# Patient Record
Sex: MALE
[De-identification: names, ages, dates, MRNs, and addresses within clinical notes are randomized per-mention and may not be internally consistent; named-entity substitution may affect disease eponyms.]

---

## 2017-01-09 PROBLEM — Z00.00 ENCOUNTER FOR PREVENTIVE HEALTH EXAMINATION: Status: ACTIVE | Noted: 2017-01-09

## 2017-01-18 ENCOUNTER — APPOINTMENT (OUTPATIENT)
Dept: ORTHOPEDIC SURGERY | Facility: CLINIC | Age: 52
End: 2017-01-18

## 2017-01-18 VITALS — SYSTOLIC BLOOD PRESSURE: 122 MMHG | DIASTOLIC BLOOD PRESSURE: 87 MMHG | HEART RATE: 98 BPM

## 2017-01-18 VITALS — HEIGHT: 66 IN | WEIGHT: 169 LBS | BODY MASS INDEX: 27.16 KG/M2

## 2017-01-18 DIAGNOSIS — Z78.9 OTHER SPECIFIED HEALTH STATUS: ICD-10-CM

## 2017-03-08 ENCOUNTER — APPOINTMENT (OUTPATIENT)
Dept: ORTHOPEDIC SURGERY | Facility: CLINIC | Age: 52
End: 2017-03-08

## 2017-03-08 DIAGNOSIS — M75.41 IMPINGEMENT SYNDROME OF RIGHT SHOULDER: ICD-10-CM

## 2017-03-17 ENCOUNTER — APPOINTMENT (OUTPATIENT)
Dept: MRI IMAGING | Facility: CLINIC | Age: 52
End: 2017-03-17

## 2017-03-17 ENCOUNTER — OUTPATIENT (OUTPATIENT)
Dept: OUTPATIENT SERVICES | Facility: HOSPITAL | Age: 52
LOS: 1 days | End: 2017-03-17
Payer: MEDICAID

## 2017-03-17 DIAGNOSIS — M75.41 IMPINGEMENT SYNDROME OF RIGHT SHOULDER: ICD-10-CM

## 2017-03-17 DIAGNOSIS — M75.01 ADHESIVE CAPSULITIS OF RIGHT SHOULDER: ICD-10-CM

## 2017-03-17 PROCEDURE — 73221 MRI JOINT UPR EXTREM W/O DYE: CPT

## 2017-03-20 PROBLEM — M75.01: Status: ACTIVE | Noted: 2017-03-20

## 2018-09-13 ENCOUNTER — APPOINTMENT (OUTPATIENT)
Dept: UROLOGY | Facility: CLINIC | Age: 53
End: 2018-09-13
Payer: MEDICAID

## 2018-09-13 VITALS — DIASTOLIC BLOOD PRESSURE: 80 MMHG | OXYGEN SATURATION: 98 % | HEART RATE: 70 BPM | SYSTOLIC BLOOD PRESSURE: 130 MMHG

## 2018-09-13 PROCEDURE — 99203 OFFICE O/P NEW LOW 30 MIN: CPT

## 2018-09-17 LAB
APPEARANCE: ABNORMAL
BACTERIA UR CULT: ABNORMAL
BACTERIA: ABNORMAL
BILIRUBIN URINE: NEGATIVE
BLOOD URINE: ABNORMAL
COLOR: YELLOW
GLUCOSE QUALITATIVE U: NEGATIVE MG/DL
KETONES URINE: NEGATIVE
LEUKOCYTE ESTERASE URINE: ABNORMAL
MICROSCOPIC-UA: NORMAL
NITRITE URINE: NEGATIVE
PH URINE: 5.5
PROTEIN URINE: 100 MG/DL
RED BLOOD CELLS URINE: 3 /HPF
SPECIFIC GRAVITY URINE: 1.02
SQUAMOUS EPITHELIAL CELLS: 0 /HPF
UROBILINOGEN URINE: NEGATIVE MG/DL
WHITE BLOOD CELLS URINE: 427 /HPF

## 2018-10-04 ENCOUNTER — APPOINTMENT (OUTPATIENT)
Dept: UROLOGY | Facility: CLINIC | Age: 53
End: 2018-10-04
Payer: MEDICAID

## 2018-10-04 VITALS
OXYGEN SATURATION: 97 % | DIASTOLIC BLOOD PRESSURE: 87 MMHG | SYSTOLIC BLOOD PRESSURE: 140 MMHG | HEART RATE: 85 BPM | TEMPERATURE: 98 F | RESPIRATION RATE: 16 BRPM

## 2018-10-04 DIAGNOSIS — N39.0 URINARY TRACT INFECTION, SITE NOT SPECIFIED: ICD-10-CM

## 2018-10-04 PROCEDURE — 99214 OFFICE O/P EST MOD 30 MIN: CPT

## 2018-10-10 LAB
APPEARANCE: CLEAR
BACTERIA UR CULT: NORMAL
BACTERIA: NEGATIVE
BILIRUBIN URINE: NEGATIVE
BLOOD URINE: NEGATIVE
COLOR: YELLOW
GLUCOSE QUALITATIVE U: NEGATIVE MG/DL
HYALINE CASTS: 1 /LPF
KETONES URINE: NEGATIVE
LEUKOCYTE ESTERASE URINE: NEGATIVE
MICROSCOPIC-UA: NORMAL
NITRITE URINE: NEGATIVE
PH URINE: 5.5
PROTEIN URINE: 100 MG/DL
PSA FREE FLD-MCNC: 5.5
PSA FREE SERPL-MCNC: 0.36 NG/ML
PSA SERPL-MCNC: 6.52 NG/ML
RED BLOOD CELLS URINE: 4 /HPF
SPECIFIC GRAVITY URINE: 1.02
SQUAMOUS EPITHELIAL CELLS: 1 /HPF
UROBILINOGEN URINE: NEGATIVE MG/DL
WHITE BLOOD CELLS URINE: 6 /HPF

## 2018-10-10 RX ORDER — CIPROFLOXACIN HYDROCHLORIDE 500 MG/1
500 TABLET, FILM COATED ORAL TWICE DAILY
Qty: 14 | Refills: 0 | Status: DISCONTINUED | COMMUNITY
Start: 2018-09-13 | End: 2018-10-10

## 2018-10-19 ENCOUNTER — APPOINTMENT (OUTPATIENT)
Dept: UROLOGY | Facility: CLINIC | Age: 53
End: 2018-10-19
Payer: MEDICAID

## 2018-10-19 ENCOUNTER — OUTPATIENT (OUTPATIENT)
Dept: OUTPATIENT SERVICES | Facility: HOSPITAL | Age: 53
LOS: 1 days | End: 2018-10-19
Payer: MEDICAID

## 2018-10-19 VITALS
SYSTOLIC BLOOD PRESSURE: 161 MMHG | RESPIRATION RATE: 16 BRPM | HEART RATE: 63 BPM | DIASTOLIC BLOOD PRESSURE: 65 MMHG | TEMPERATURE: 98.1 F

## 2018-10-19 DIAGNOSIS — R35.0 FREQUENCY OF MICTURITION: ICD-10-CM

## 2018-10-19 PROCEDURE — 76872 US TRANSRECTAL: CPT | Mod: 26

## 2018-10-19 PROCEDURE — 55700: CPT

## 2018-10-19 PROCEDURE — 76942 ECHO GUIDE FOR BIOPSY: CPT | Mod: 59

## 2018-10-19 PROCEDURE — 76942 ECHO GUIDE FOR BIOPSY: CPT | Mod: 26,59

## 2018-10-19 PROCEDURE — 76872 US TRANSRECTAL: CPT

## 2018-10-23 DIAGNOSIS — R97.20 ELEVATED PROSTATE SPECIFIC ANTIGEN [PSA]: ICD-10-CM

## 2018-10-23 DIAGNOSIS — N40.2 NODULAR PROSTATE WITHOUT LOWER URINARY TRACT SYMPTOMS: ICD-10-CM

## 2018-10-29 ENCOUNTER — APPOINTMENT (OUTPATIENT)
Dept: UROLOGY | Facility: CLINIC | Age: 53
End: 2018-10-29
Payer: MEDICAID

## 2018-10-29 DIAGNOSIS — N40.2 NODULAR PROSTATE W/OUT LOWER URINARY TRACT SYMPTOMS: ICD-10-CM

## 2018-10-29 LAB — CORE LAB BIOPSY: NORMAL

## 2018-10-29 PROCEDURE — 99214 OFFICE O/P EST MOD 30 MIN: CPT

## 2018-11-06 ENCOUNTER — OTHER (OUTPATIENT)
Age: 53
End: 2018-11-06

## 2018-11-06 PROBLEM — N40.2 PROSTATE NODULE: Status: ACTIVE | Noted: 2018-10-10

## 2018-11-07 ENCOUNTER — FORM ENCOUNTER (OUTPATIENT)
Age: 53
End: 2018-11-07

## 2018-11-08 ENCOUNTER — OUTPATIENT (OUTPATIENT)
Dept: OUTPATIENT SERVICES | Facility: HOSPITAL | Age: 53
LOS: 1 days | End: 2018-11-08
Payer: MEDICAID

## 2018-11-08 ENCOUNTER — APPOINTMENT (OUTPATIENT)
Dept: MRI IMAGING | Facility: IMAGING CENTER | Age: 53
End: 2018-11-08
Payer: MEDICAID

## 2018-11-08 DIAGNOSIS — N40.2 NODULAR PROSTATE WITHOUT LOWER URINARY TRACT SYMPTOMS: ICD-10-CM

## 2018-11-08 DIAGNOSIS — C61 MALIGNANT NEOPLASM OF PROSTATE: ICD-10-CM

## 2018-11-08 PROCEDURE — 72197 MRI PELVIS W/O & W/DYE: CPT | Mod: 26

## 2018-11-08 PROCEDURE — A9585: CPT

## 2018-11-08 PROCEDURE — 72197 MRI PELVIS W/O & W/DYE: CPT

## 2018-11-16 ENCOUNTER — APPOINTMENT (OUTPATIENT)
Dept: UROLOGY | Facility: CLINIC | Age: 53
End: 2018-11-16
Payer: MEDICAID

## 2018-11-16 PROCEDURE — 99214 OFFICE O/P EST MOD 30 MIN: CPT

## 2019-01-10 ENCOUNTER — OUTPATIENT (OUTPATIENT)
Dept: OUTPATIENT SERVICES | Facility: HOSPITAL | Age: 54
LOS: 1 days | End: 2019-01-10

## 2019-01-10 VITALS
OXYGEN SATURATION: 99 % | RESPIRATION RATE: 16 BRPM | SYSTOLIC BLOOD PRESSURE: 160 MMHG | WEIGHT: 171.96 LBS | HEIGHT: 64 IN | DIASTOLIC BLOOD PRESSURE: 78 MMHG | TEMPERATURE: 97 F | HEART RATE: 78 BPM

## 2019-01-10 DIAGNOSIS — C61 MALIGNANT NEOPLASM OF PROSTATE: ICD-10-CM

## 2019-01-10 LAB
APPEARANCE UR: CLEAR — SIGNIFICANT CHANGE UP
BACTERIA # UR AUTO: SIGNIFICANT CHANGE UP
BILIRUB UR-MCNC: NEGATIVE — SIGNIFICANT CHANGE UP
BLD GP AB SCN SERPL QL: NEGATIVE — SIGNIFICANT CHANGE UP
BLOOD UR QL VISUAL: NEGATIVE — SIGNIFICANT CHANGE UP
COLOR SPEC: SIGNIFICANT CHANGE UP
GLUCOSE UR-MCNC: NEGATIVE — SIGNIFICANT CHANGE UP
HYALINE CASTS # UR AUTO: NEGATIVE — SIGNIFICANT CHANGE UP
KETONES UR-MCNC: NEGATIVE — SIGNIFICANT CHANGE UP
LEUKOCYTE ESTERASE UR-ACNC: SIGNIFICANT CHANGE UP
NITRITE UR-MCNC: NEGATIVE — SIGNIFICANT CHANGE UP
PH UR: 6.5 — SIGNIFICANT CHANGE UP (ref 5–8)
PROT UR-MCNC: 100 — HIGH
RBC CASTS # UR COMP ASSIST: SIGNIFICANT CHANGE UP (ref 0–?)
RH IG SCN BLD-IMP: POSITIVE — SIGNIFICANT CHANGE UP
SP GR SPEC: 1.02 — SIGNIFICANT CHANGE UP (ref 1–1.04)
SQUAMOUS # UR AUTO: SIGNIFICANT CHANGE UP
UROBILINOGEN FLD QL: NORMAL — SIGNIFICANT CHANGE UP
WBC UR QL: HIGH (ref 0–?)

## 2019-01-10 NOTE — H&P PST ADULT - PROBLEM SELECTOR PLAN 1
pt scheduled for Robotic Assisted Laparoscopic Prostatectomy, Bilateral Pelvic Lymph Node Dissection, Possible Open on 1/16/19  Preop instructions provided. Pt verbalized understanding.   Pepcid for GI prophylaxis provided   Chlorhexidine wash with instructions provided.   elevated BP @ /78, pt reports "im very nervous", s/p med eval, copy in chart pt scheduled for Robotic Assisted Laparoscopic Prostatectomy, Bilateral Pelvic Lymph Node Dissection, Possible Open on 1/16/19  Preop instructions provided. Pt verbalized understanding.   Pepcid for GI prophylaxis provided   Chlorhexidine wash with instructions provided.   s/p med brigitte, copy in chart  pt's BP @ /80 & 158/78, pt referred to PCP for elevated BP, update clearance requested

## 2019-01-10 NOTE — H&P PST ADULT - ASSESSMENT
53 y.o. female with preop diagnosis of malignant neoplasm of prostate 53 y.o. female with preop diagnosis of malignant neoplasm of prostate. Preoperative urine culture positive for ESBL E. Coli. Patient admitted for IV ertapenem prior to operation.

## 2019-01-10 NOTE — H&P PST ADULT - HISTORY OF PRESENT ILLNESS
53 y.o. male with hx of elevated PSA, followed by prostate biopsy, diagnosed with prostate cancer, denies hematuria, nocturia, dysuria, weight loss or fatigue, presents to PST for evaluation for Robotic Assisted Laparoscopic Prostatectomy, Bilateral Pelvic Lymph Node Dissection, Possible Open on 1/16/19 53 y.o. male with no PMH, reports hx of elevated PSA, followed by prostate biopsy, diagnosed with prostate cancer, denies hematuria, nocturia, dysuria, weight loss or fatigue, presents to [facility] for evaluation for Robotic Assisted Laparoscopic Prostatectomy, Bilateral Pelvic Lymph Node Dissection, Possible Open on 1/16/19 53 y.o. male with no PMH, reports hx of elevated PSA, followed by prostate biopsy, diagnosed with prostate cancer, denies hematuria, nocturia, dysuria, weight loss or fatigue, presents to PST for evaluation for Robotic Assisted Laparoscopic Prostatectomy, Bilateral Pelvic Lymph Node Dissection, Possible Open on 1/16/19    Presented to ER on 1/15 with ESBL in urine. No fever or chills.

## 2019-01-10 NOTE — H&P PST ADULT - NEGATIVE ENMT SYMPTOMS
no dry mouth/no throat pain/no nose bleeds/no dysphagia/no nasal congestion/no hearing difficulty/no sinus symptoms

## 2019-01-10 NOTE — H&P PST ADULT - ATTENDING COMMENTS
Pt seen and examined. Agree with above. IV ertapenem given for ESBL in urine. Minimal sx and non-toxic.

## 2019-01-10 NOTE — H&P PST ADULT - NEGATIVE MUSCULOSKELETAL SYMPTOMS
no joint swelling/no muscle weakness/no neck pain/no myalgia/no muscle cramps/no back pain/no stiffness/no arthritis

## 2019-01-12 LAB — SPECIMEN SOURCE: SIGNIFICANT CHANGE UP

## 2019-01-13 LAB
METHOD TYPE: SIGNIFICANT CHANGE UP
ORGANISM # SPEC MICROSCOPIC CNT: SIGNIFICANT CHANGE UP

## 2019-01-14 ENCOUNTER — MESSAGE (OUTPATIENT)
Age: 54
End: 2019-01-14

## 2019-01-14 LAB
-  AMIKACIN: SIGNIFICANT CHANGE UP
-  AMPICILLIN/SULBACTAM: SIGNIFICANT CHANGE UP
-  AMPICILLIN: SIGNIFICANT CHANGE UP
-  AZTREONAM: SIGNIFICANT CHANGE UP
-  CEFAZOLIN: SIGNIFICANT CHANGE UP
-  CEFEPIME: SIGNIFICANT CHANGE UP
-  CEFOXITIN: SIGNIFICANT CHANGE UP
-  CEFTAZIDIME: SIGNIFICANT CHANGE UP
-  CEFTRIAXONE: SIGNIFICANT CHANGE UP
-  CIPROFLOXACIN: SIGNIFICANT CHANGE UP
-  ERTAPENEM: SIGNIFICANT CHANGE UP
-  GENTAMICIN: SIGNIFICANT CHANGE UP
-  IMIPENEM: SIGNIFICANT CHANGE UP
-  LEVOFLOXACIN: SIGNIFICANT CHANGE UP
-  MEROPENEM: SIGNIFICANT CHANGE UP
-  NITROFURANTOIN: SIGNIFICANT CHANGE UP
-  PIPERACILLIN/TAZOBACTAM: SIGNIFICANT CHANGE UP
-  TIGECYCLINE: SIGNIFICANT CHANGE UP
-  TOBRAMYCIN: SIGNIFICANT CHANGE UP
-  TRIMETHOPRIM/SULFAMETHOXAZOLE: SIGNIFICANT CHANGE UP
BACTERIA UR CULT: SIGNIFICANT CHANGE UP

## 2019-01-15 ENCOUNTER — TRANSCRIPTION ENCOUNTER (OUTPATIENT)
Age: 54
End: 2019-01-15

## 2019-01-15 ENCOUNTER — INPATIENT (INPATIENT)
Facility: HOSPITAL | Age: 54
LOS: 2 days | Discharge: HOME | End: 2019-01-18
Attending: UROLOGY | Admitting: UROLOGY
Payer: MEDICAID

## 2019-01-15 VITALS
HEART RATE: 80 BPM | DIASTOLIC BLOOD PRESSURE: 84 MMHG | OXYGEN SATURATION: 100 % | TEMPERATURE: 98 F | RESPIRATION RATE: 16 BRPM | SYSTOLIC BLOOD PRESSURE: 156 MMHG

## 2019-01-15 DIAGNOSIS — A49.9 BACTERIAL INFECTION, UNSPECIFIED: ICD-10-CM

## 2019-01-15 PROBLEM — C61 MALIGNANT NEOPLASM OF PROSTATE: Chronic | Status: ACTIVE | Noted: 2019-01-10

## 2019-01-15 LAB
ALBUMIN SERPL ELPH-MCNC: 4.7 G/DL — SIGNIFICANT CHANGE UP (ref 3.3–5)
ALP SERPL-CCNC: 75 U/L — SIGNIFICANT CHANGE UP (ref 40–120)
ALT FLD-CCNC: 29 U/L — SIGNIFICANT CHANGE UP (ref 4–41)
ANION GAP SERPL CALC-SCNC: 10 MEQ/L — SIGNIFICANT CHANGE UP (ref 7–14)
APPEARANCE UR: CLEAR — SIGNIFICANT CHANGE UP
APTT BLD: 29.3 SEC — SIGNIFICANT CHANGE UP (ref 27.5–36.3)
AST SERPL-CCNC: 17 U/L — SIGNIFICANT CHANGE UP (ref 4–40)
BACTERIA # UR AUTO: SIGNIFICANT CHANGE UP
BASOPHILS # BLD AUTO: 0.04 K/UL — SIGNIFICANT CHANGE UP (ref 0–0.2)
BASOPHILS NFR BLD AUTO: 0.7 % — SIGNIFICANT CHANGE UP (ref 0–2)
BILIRUB SERPL-MCNC: 0.5 MG/DL — SIGNIFICANT CHANGE UP (ref 0.2–1.2)
BILIRUB UR-MCNC: NEGATIVE — SIGNIFICANT CHANGE UP
BLD GP AB SCN SERPL QL: NEGATIVE — SIGNIFICANT CHANGE UP
BLOOD UR QL VISUAL: NEGATIVE — SIGNIFICANT CHANGE UP
BUN SERPL-MCNC: 12 MG/DL — SIGNIFICANT CHANGE UP (ref 7–23)
CALCIUM SERPL-MCNC: 9.2 MG/DL — SIGNIFICANT CHANGE UP (ref 8.4–10.5)
CHLORIDE SERPL-SCNC: 103 MMOL/L — SIGNIFICANT CHANGE UP (ref 98–107)
CO2 SERPL-SCNC: 27 MMOL/L — SIGNIFICANT CHANGE UP (ref 22–31)
COLOR SPEC: SIGNIFICANT CHANGE UP
CREAT SERPL-MCNC: 0.99 MG/DL — SIGNIFICANT CHANGE UP (ref 0.5–1.3)
EOSINOPHIL # BLD AUTO: 0.11 K/UL — SIGNIFICANT CHANGE UP (ref 0–0.5)
EOSINOPHIL NFR BLD AUTO: 2 % — SIGNIFICANT CHANGE UP (ref 0–6)
GLUCOSE SERPL-MCNC: 94 MG/DL — SIGNIFICANT CHANGE UP (ref 70–99)
GLUCOSE UR-MCNC: NEGATIVE — SIGNIFICANT CHANGE UP
HCT VFR BLD CALC: 45.2 % — SIGNIFICANT CHANGE UP (ref 39–50)
HGB BLD-MCNC: 15.3 G/DL — SIGNIFICANT CHANGE UP (ref 13–17)
HYALINE CASTS # UR AUTO: NEGATIVE — SIGNIFICANT CHANGE UP
IMM GRANULOCYTES NFR BLD AUTO: 0.2 % — SIGNIFICANT CHANGE UP (ref 0–1.5)
INR BLD: 1 — SIGNIFICANT CHANGE UP (ref 0.88–1.17)
KETONES UR-MCNC: NEGATIVE — SIGNIFICANT CHANGE UP
LEUKOCYTE ESTERASE UR-ACNC: SIGNIFICANT CHANGE UP
LYMPHOCYTES # BLD AUTO: 1.53 K/UL — SIGNIFICANT CHANGE UP (ref 1–3.3)
LYMPHOCYTES # BLD AUTO: 27.8 % — SIGNIFICANT CHANGE UP (ref 13–44)
MCHC RBC-ENTMCNC: 28.7 PG — SIGNIFICANT CHANGE UP (ref 27–34)
MCHC RBC-ENTMCNC: 33.8 % — SIGNIFICANT CHANGE UP (ref 32–36)
MCV RBC AUTO: 84.8 FL — SIGNIFICANT CHANGE UP (ref 80–100)
MONOCYTES # BLD AUTO: 0.69 K/UL — SIGNIFICANT CHANGE UP (ref 0–0.9)
MONOCYTES NFR BLD AUTO: 12.5 % — SIGNIFICANT CHANGE UP (ref 2–14)
NEUTROPHILS # BLD AUTO: 3.12 K/UL — SIGNIFICANT CHANGE UP (ref 1.8–7.4)
NEUTROPHILS NFR BLD AUTO: 56.8 % — SIGNIFICANT CHANGE UP (ref 43–77)
NITRITE UR-MCNC: NEGATIVE — SIGNIFICANT CHANGE UP
NRBC # FLD: 0 K/UL — LOW (ref 25–125)
PH UR: 6.5 — SIGNIFICANT CHANGE UP (ref 5–8)
PLATELET # BLD AUTO: 193 K/UL — SIGNIFICANT CHANGE UP (ref 150–400)
PMV BLD: 11.1 FL — SIGNIFICANT CHANGE UP (ref 7–13)
POTASSIUM SERPL-MCNC: 4 MMOL/L — SIGNIFICANT CHANGE UP (ref 3.5–5.3)
POTASSIUM SERPL-SCNC: 4 MMOL/L — SIGNIFICANT CHANGE UP (ref 3.5–5.3)
PROT SERPL-MCNC: 7.6 G/DL — SIGNIFICANT CHANGE UP (ref 6–8.3)
PROT UR-MCNC: 50 — SIGNIFICANT CHANGE UP
PROTHROM AB SERPL-ACNC: 11.1 SEC — SIGNIFICANT CHANGE UP (ref 9.8–13.1)
RBC # BLD: 5.33 M/UL — SIGNIFICANT CHANGE UP (ref 4.2–5.8)
RBC # FLD: 12.7 % — SIGNIFICANT CHANGE UP (ref 10.3–14.5)
RBC CASTS # UR COMP ASSIST: SIGNIFICANT CHANGE UP (ref 0–?)
RH IG SCN BLD-IMP: POSITIVE — SIGNIFICANT CHANGE UP
SODIUM SERPL-SCNC: 140 MMOL/L — SIGNIFICANT CHANGE UP (ref 135–145)
SP GR SPEC: 1.01 — SIGNIFICANT CHANGE UP (ref 1–1.04)
SQUAMOUS # UR AUTO: SIGNIFICANT CHANGE UP
UROBILINOGEN FLD QL: NORMAL — SIGNIFICANT CHANGE UP
WBC # BLD: 5.5 K/UL — SIGNIFICANT CHANGE UP (ref 3.8–10.5)
WBC # FLD AUTO: 5.5 K/UL — SIGNIFICANT CHANGE UP (ref 3.8–10.5)
WBC UR QL: HIGH (ref 0–?)

## 2019-01-15 RX ORDER — SODIUM CHLORIDE 9 MG/ML
1000 INJECTION INTRAMUSCULAR; INTRAVENOUS; SUBCUTANEOUS
Qty: 0 | Refills: 0 | Status: DISCONTINUED | OUTPATIENT
Start: 2019-01-15 | End: 2019-01-15

## 2019-01-15 RX ORDER — SODIUM CHLORIDE 9 MG/ML
1000 INJECTION INTRAMUSCULAR; INTRAVENOUS; SUBCUTANEOUS
Qty: 0 | Refills: 0 | Status: DISCONTINUED | OUTPATIENT
Start: 2019-01-15 | End: 2019-01-16

## 2019-01-15 RX ORDER — OXYCODONE AND ACETAMINOPHEN 5; 325 MG/1; MG/1
2 TABLET ORAL EVERY 6 HOURS
Qty: 0 | Refills: 0 | Status: DISCONTINUED | OUTPATIENT
Start: 2019-01-15 | End: 2019-01-16

## 2019-01-15 RX ORDER — SENNA PLUS 8.6 MG/1
2 TABLET ORAL AT BEDTIME
Qty: 0 | Refills: 0 | Status: DISCONTINUED | OUTPATIENT
Start: 2019-01-15 | End: 2019-01-18

## 2019-01-15 RX ORDER — ONDANSETRON 8 MG/1
4 TABLET, FILM COATED ORAL EVERY 6 HOURS
Qty: 0 | Refills: 0 | Status: DISCONTINUED | OUTPATIENT
Start: 2019-01-15 | End: 2019-01-18

## 2019-01-15 RX ORDER — OXYCODONE AND ACETAMINOPHEN 5; 325 MG/1; MG/1
1 TABLET ORAL EVERY 4 HOURS
Qty: 0 | Refills: 0 | Status: DISCONTINUED | OUTPATIENT
Start: 2019-01-15 | End: 2019-01-16

## 2019-01-15 RX ORDER — ERTAPENEM SODIUM 1 G/1
1000 INJECTION, POWDER, LYOPHILIZED, FOR SOLUTION INTRAMUSCULAR; INTRAVENOUS ONCE
Qty: 0 | Refills: 0 | Status: COMPLETED | OUTPATIENT
Start: 2019-01-15 | End: 2019-01-15

## 2019-01-15 RX ORDER — HEPARIN SODIUM 5000 [USP'U]/ML
5000 INJECTION INTRAVENOUS; SUBCUTANEOUS EVERY 8 HOURS
Qty: 0 | Refills: 0 | Status: DISCONTINUED | OUTPATIENT
Start: 2019-01-15 | End: 2019-01-18

## 2019-01-15 RX ADMIN — SODIUM CHLORIDE 150 MILLILITER(S): 9 INJECTION INTRAMUSCULAR; INTRAVENOUS; SUBCUTANEOUS at 11:13

## 2019-01-15 RX ADMIN — ERTAPENEM SODIUM 120 MILLIGRAM(S): 1 INJECTION, POWDER, LYOPHILIZED, FOR SOLUTION INTRAMUSCULAR; INTRAVENOUS at 11:13

## 2019-01-15 RX ADMIN — HEPARIN SODIUM 5000 UNIT(S): 5000 INJECTION INTRAVENOUS; SUBCUTANEOUS at 22:22

## 2019-01-15 RX ADMIN — ERTAPENEM SODIUM 1000 MILLIGRAM(S): 1 INJECTION, POWDER, LYOPHILIZED, FOR SOLUTION INTRAMUSCULAR; INTRAVENOUS at 12:25

## 2019-01-15 RX ADMIN — SODIUM CHLORIDE 75 MILLILITER(S): 9 INJECTION INTRAMUSCULAR; INTRAVENOUS; SUBCUTANEOUS at 12:25

## 2019-01-15 NOTE — ED PROVIDER NOTE - MEDICAL DECISION MAKING DETAILS
pt with known prostate cancer, prostatectomy tomorrow, sent for ESBL UTI on preop labs, no symptoms; will speak with urology and check preop labs, give abx, re-eval

## 2019-01-15 NOTE — ED ADULT NURSE REASSESSMENT NOTE - NS ED NURSE REASSESS COMMENT FT1
received pt from intake to rw, pt A&OX3 Luxembourgish speaking but understands english, daughter at bedside, pt denies pain at this time, lying on stretcher safety maintained will monitor,

## 2019-01-15 NOTE — ED ADULT TRIAGE NOTE - CHIEF COMPLAINT QUOTE
patient is scheduled to have prostate surgery tomorrow, pre op labs performed 01/10/19, and was sent to ED for treatment of (+) Urine infection and IV antibiotic tx.

## 2019-01-15 NOTE — ED PROVIDER NOTE - CARE PLAN
Principal Discharge DX:	ESBL (extended spectrum beta-lactamase) producing bacteria infection  Secondary Diagnosis:	Prostate cancer

## 2019-01-15 NOTE — ED PROVIDER NOTE - PROGRESS NOTE DETAILS
Dr. Calzada: spoke to urology, will evaluate pt in ED, recommend ertapenem Dr. Calzada: pt seen by urology, recommend admission to Dr. Santana

## 2019-01-15 NOTE — ED PROVIDER NOTE - OBJECTIVE STATEMENT
52 yo male with prostate cancer, scheduled for prostatectomy with Dr. Santana tomorrow, sent to ED by Dr. Santana's office because pre-op labs showed ESBL+ UA.  Pt notes no complaints--no dysuria, hematuria, fever, N/V/D, abdominal pain, CP/SOB or groin pain.

## 2019-01-16 ENCOUNTER — RESULT REVIEW (OUTPATIENT)
Age: 54
End: 2019-01-16

## 2019-01-16 ENCOUNTER — APPOINTMENT (OUTPATIENT)
Dept: UROLOGY | Facility: HOSPITAL | Age: 54
End: 2019-01-16

## 2019-01-16 DIAGNOSIS — C61 MALIGNANT NEOPLASM OF PROSTATE: ICD-10-CM

## 2019-01-16 PROCEDURE — 38571 LAPAROSCOPY LYMPHADENECTOMY: CPT

## 2019-01-16 PROCEDURE — 99222 1ST HOSP IP/OBS MODERATE 55: CPT | Mod: 25

## 2019-01-16 PROCEDURE — 88309 TISSUE EXAM BY PATHOLOGIST: CPT | Mod: 26

## 2019-01-16 PROCEDURE — 88307 TISSUE EXAM BY PATHOLOGIST: CPT | Mod: 26

## 2019-01-16 PROCEDURE — 55866 LAPS SURG PRST8ECT RPBIC RAD: CPT

## 2019-01-16 RX ORDER — OXYCODONE HYDROCHLORIDE 5 MG/1
5 TABLET ORAL EVERY 6 HOURS
Qty: 0 | Refills: 0 | Status: DISCONTINUED | OUTPATIENT
Start: 2019-01-16 | End: 2019-01-18

## 2019-01-16 RX ORDER — KETOROLAC TROMETHAMINE 30 MG/ML
15 SYRINGE (ML) INJECTION EVERY 6 HOURS
Qty: 0 | Refills: 0 | Status: DISCONTINUED | OUTPATIENT
Start: 2019-01-16 | End: 2019-01-18

## 2019-01-16 RX ORDER — OXYCODONE HYDROCHLORIDE 5 MG/1
10 TABLET ORAL EVERY 6 HOURS
Qty: 0 | Refills: 0 | Status: DISCONTINUED | OUTPATIENT
Start: 2019-01-16 | End: 2019-01-18

## 2019-01-16 RX ORDER — ACETAMINOPHEN 500 MG
650 TABLET ORAL EVERY 6 HOURS
Qty: 0 | Refills: 0 | Status: DISCONTINUED | OUTPATIENT
Start: 2019-01-16 | End: 2019-01-18

## 2019-01-16 RX ORDER — SODIUM CHLORIDE 9 MG/ML
1000 INJECTION, SOLUTION INTRAVENOUS
Qty: 0 | Refills: 0 | Status: DISCONTINUED | OUTPATIENT
Start: 2019-01-16 | End: 2019-01-17

## 2019-01-16 RX ORDER — ONDANSETRON 8 MG/1
4 TABLET, FILM COATED ORAL ONCE
Qty: 0 | Refills: 0 | Status: DISCONTINUED | OUTPATIENT
Start: 2019-01-16 | End: 2019-01-18

## 2019-01-16 RX ORDER — ERTAPENEM SODIUM 1 G/1
1000 INJECTION, POWDER, LYOPHILIZED, FOR SOLUTION INTRAMUSCULAR; INTRAVENOUS EVERY 24 HOURS
Qty: 0 | Refills: 0 | Status: DISCONTINUED | OUTPATIENT
Start: 2019-01-17 | End: 2019-01-18

## 2019-01-16 RX ORDER — ERTAPENEM SODIUM 1 G/1
1000 INJECTION, POWDER, LYOPHILIZED, FOR SOLUTION INTRAMUSCULAR; INTRAVENOUS ONCE
Qty: 0 | Refills: 0 | Status: COMPLETED | OUTPATIENT
Start: 2019-01-16 | End: 2019-01-16

## 2019-01-16 RX ORDER — ERTAPENEM SODIUM 1 G/1
INJECTION, POWDER, LYOPHILIZED, FOR SOLUTION INTRAMUSCULAR; INTRAVENOUS
Qty: 0 | Refills: 0 | Status: DISCONTINUED | OUTPATIENT
Start: 2019-01-16 | End: 2019-01-18

## 2019-01-16 RX ORDER — HYDROMORPHONE HYDROCHLORIDE 2 MG/ML
0.5 INJECTION INTRAMUSCULAR; INTRAVENOUS; SUBCUTANEOUS
Qty: 0 | Refills: 0 | Status: DISCONTINUED | OUTPATIENT
Start: 2019-01-16 | End: 2019-01-16

## 2019-01-16 RX ORDER — OXYBUTYNIN CHLORIDE 5 MG
5 TABLET ORAL THREE TIMES A DAY
Qty: 0 | Refills: 0 | Status: DISCONTINUED | OUTPATIENT
Start: 2019-01-16 | End: 2019-01-18

## 2019-01-16 RX ORDER — DOCUSATE SODIUM 100 MG
100 CAPSULE ORAL THREE TIMES A DAY
Qty: 0 | Refills: 0 | Status: DISCONTINUED | OUTPATIENT
Start: 2019-01-16 | End: 2019-01-18

## 2019-01-16 RX ADMIN — HYDROMORPHONE HYDROCHLORIDE 0.5 MILLIGRAM(S): 2 INJECTION INTRAMUSCULAR; INTRAVENOUS; SUBCUTANEOUS at 21:15

## 2019-01-16 RX ADMIN — HYDROMORPHONE HYDROCHLORIDE 0.5 MILLIGRAM(S): 2 INJECTION INTRAMUSCULAR; INTRAVENOUS; SUBCUTANEOUS at 20:40

## 2019-01-16 RX ADMIN — Medication 100 MILLIGRAM(S): at 21:46

## 2019-01-16 RX ADMIN — OXYCODONE HYDROCHLORIDE 10 MILLIGRAM(S): 5 TABLET ORAL at 22:31

## 2019-01-16 RX ADMIN — ERTAPENEM SODIUM 120 MILLIGRAM(S): 1 INJECTION, POWDER, LYOPHILIZED, FOR SOLUTION INTRAMUSCULAR; INTRAVENOUS at 21:46

## 2019-01-16 RX ADMIN — HYDROMORPHONE HYDROCHLORIDE 0.5 MILLIGRAM(S): 2 INJECTION INTRAMUSCULAR; INTRAVENOUS; SUBCUTANEOUS at 20:55

## 2019-01-16 RX ADMIN — HYDROMORPHONE HYDROCHLORIDE 0.5 MILLIGRAM(S): 2 INJECTION INTRAMUSCULAR; INTRAVENOUS; SUBCUTANEOUS at 22:15

## 2019-01-16 RX ADMIN — OXYCODONE HYDROCHLORIDE 10 MILLIGRAM(S): 5 TABLET ORAL at 21:55

## 2019-01-16 RX ADMIN — HYDROMORPHONE HYDROCHLORIDE 0.5 MILLIGRAM(S): 2 INJECTION INTRAMUSCULAR; INTRAVENOUS; SUBCUTANEOUS at 21:16

## 2019-01-16 RX ADMIN — HYDROMORPHONE HYDROCHLORIDE 0.5 MILLIGRAM(S): 2 INJECTION INTRAMUSCULAR; INTRAVENOUS; SUBCUTANEOUS at 21:55

## 2019-01-16 RX ADMIN — HEPARIN SODIUM 5000 UNIT(S): 5000 INJECTION INTRAVENOUS; SUBCUTANEOUS at 21:46

## 2019-01-16 RX ADMIN — HEPARIN SODIUM 5000 UNIT(S): 5000 INJECTION INTRAVENOUS; SUBCUTANEOUS at 06:01

## 2019-01-16 RX ADMIN — HYDROMORPHONE HYDROCHLORIDE 0.5 MILLIGRAM(S): 2 INJECTION INTRAMUSCULAR; INTRAVENOUS; SUBCUTANEOUS at 21:30

## 2019-01-16 NOTE — BRIEF OPERATIVE NOTE - PROCEDURE
<<-----Click on this checkbox to enter Procedure Robot-assisted laparoscopic radical prostatectomy  01/16/2019    Active  [id number]

## 2019-01-16 NOTE — PROGRESS NOTE ADULT - SUBJECTIVE AND OBJECTIVE BOX
Note    Post op Check    s/p : RALP    Pt seen / examined without complaints pain controlled    Vital Signs Last 24 Hrs  T(C): 36.4 (16 Jan 2019 22:00), Max: 37.2 (16 Jan 2019 10:12)  T(F): 97.5 (16 Jan 2019 22:00), Max: 98.9 (16 Jan 2019 10:12)  HR: 84 (16 Jan 2019 22:30) (63 - 89)  BP: 156/76 (16 Jan 2019 22:30) (142/67 - 173/75)  BP(mean): 91 (16 Jan 2019 22:30) (85 - 98)  RR: 13 (16 Jan 2019 22:30) (10 - 17)  SpO2: 99% (16 Jan 2019 22:30) (96% - 100%)    I&O's Summary    15 Amandeep 2019 07:01  -  16 Jan 2019 07:00  --------------------------------------------------------  IN: 0 mL / OUT: 720 mL / NET: -720 mL    16 Jan 2019 07:01  -  16 Jan 2019 23:05  --------------------------------------------------------  IN: 925 mL / OUT: 1140 mL / NET: -215 mL    EBL=500  F250  NICOLAS=90    PHYSICAL EXAM:       Constitutional: awake alert NAD    Respiratory: no resp distress    Cardiovascular: RR    Gastrointestinal: soft, NT/ ND trocar sites CDI, appropriately tender    Genitourinary: enamorado in place draining well- yellow    Extremities: (+) venodynes                                        15.3   5.50  )-----------( 193      ( 15 Amandeep 2019 11:01 )             45.2       01-15    140  |  103  |  12  ----------------------------<  94  4.0   |  27  |  0.99    Ca    9.2      15 Amandeep 2019 11:01    TPro  7.6  /  Alb  4.7  /  TBili  0.5  /  DBili  x   /  AST  17  /  ALT  29  /  AlkPhos  75  01-15

## 2019-01-17 DIAGNOSIS — R03.0 ELEVATED BLOOD-PRESSURE READING, WITHOUT DIAGNOSIS OF HYPERTENSION: ICD-10-CM

## 2019-01-17 DIAGNOSIS — N39.0 URINARY TRACT INFECTION, SITE NOT SPECIFIED: ICD-10-CM

## 2019-01-17 DIAGNOSIS — D50.0 IRON DEFICIENCY ANEMIA SECONDARY TO BLOOD LOSS (CHRONIC): ICD-10-CM

## 2019-01-17 LAB
ANION GAP SERPL CALC-SCNC: 13 MMO/L — SIGNIFICANT CHANGE UP (ref 7–14)
BASOPHILS # BLD AUTO: 0 K/UL — SIGNIFICANT CHANGE UP (ref 0–0.2)
BASOPHILS NFR BLD AUTO: 0 % — SIGNIFICANT CHANGE UP (ref 0–2)
BUN SERPL-MCNC: 17 MG/DL — SIGNIFICANT CHANGE UP (ref 7–23)
CALCIUM SERPL-MCNC: 8.3 MG/DL — LOW (ref 8.4–10.5)
CHLORIDE SERPL-SCNC: 102 MMOL/L — SIGNIFICANT CHANGE UP (ref 98–107)
CO2 SERPL-SCNC: 25 MMOL/L — SIGNIFICANT CHANGE UP (ref 22–31)
CREAT FLD-MCNC: 1.07 MG/DL — SIGNIFICANT CHANGE UP
CREAT SERPL-MCNC: 1.05 MG/DL — SIGNIFICANT CHANGE UP (ref 0.5–1.3)
EOSINOPHIL # BLD AUTO: 0 K/UL — SIGNIFICANT CHANGE UP (ref 0–0.5)
EOSINOPHIL NFR BLD AUTO: 0 % — SIGNIFICANT CHANGE UP (ref 0–6)
GLUCOSE SERPL-MCNC: 137 MG/DL — HIGH (ref 70–99)
HCT VFR BLD CALC: 36.6 % — LOW (ref 39–50)
HGB BLD-MCNC: 12 G/DL — LOW (ref 13–17)
IMM GRANULOCYTES NFR BLD AUTO: 0.4 % — SIGNIFICANT CHANGE UP (ref 0–1.5)
LYMPHOCYTES # BLD AUTO: 0.73 K/UL — LOW (ref 1–3.3)
LYMPHOCYTES # BLD AUTO: 9.1 % — LOW (ref 13–44)
MCHC RBC-ENTMCNC: 28.6 PG — SIGNIFICANT CHANGE UP (ref 27–34)
MCHC RBC-ENTMCNC: 32.8 % — SIGNIFICANT CHANGE UP (ref 32–36)
MCV RBC AUTO: 87.1 FL — SIGNIFICANT CHANGE UP (ref 80–100)
MONOCYTES # BLD AUTO: 0.69 K/UL — SIGNIFICANT CHANGE UP (ref 0–0.9)
MONOCYTES NFR BLD AUTO: 8.6 % — SIGNIFICANT CHANGE UP (ref 2–14)
NEUTROPHILS # BLD AUTO: 6.57 K/UL — SIGNIFICANT CHANGE UP (ref 1.8–7.4)
NEUTROPHILS NFR BLD AUTO: 81.9 % — HIGH (ref 43–77)
NRBC # FLD: 0 K/UL — LOW (ref 25–125)
PLATELET # BLD AUTO: 178 K/UL — SIGNIFICANT CHANGE UP (ref 150–400)
PMV BLD: 11.4 FL — SIGNIFICANT CHANGE UP (ref 7–13)
POTASSIUM SERPL-MCNC: 4.4 MMOL/L — SIGNIFICANT CHANGE UP (ref 3.5–5.3)
POTASSIUM SERPL-SCNC: 4.4 MMOL/L — SIGNIFICANT CHANGE UP (ref 3.5–5.3)
RBC # BLD: 4.2 M/UL — SIGNIFICANT CHANGE UP (ref 4.2–5.8)
RBC # FLD: 13.1 % — SIGNIFICANT CHANGE UP (ref 10.3–14.5)
SODIUM SERPL-SCNC: 140 MMOL/L — SIGNIFICANT CHANGE UP (ref 135–145)
SPECIMEN SOURCE: SIGNIFICANT CHANGE UP
WBC # BLD: 8.02 K/UL — SIGNIFICANT CHANGE UP (ref 3.8–10.5)
WBC # FLD AUTO: 8.02 K/UL — SIGNIFICANT CHANGE UP (ref 3.8–10.5)

## 2019-01-17 PROCEDURE — 99223 1ST HOSP IP/OBS HIGH 75: CPT

## 2019-01-17 RX ORDER — CEFUROXIME AXETIL 500 MG/1
500 TABLET ORAL TWICE DAILY
Qty: 4 | Refills: 0 | Status: DISCONTINUED | COMMUNITY
Start: 2018-10-10 | End: 2019-01-17

## 2019-01-17 RX ORDER — SODIUM CHLORIDE 9 MG/ML
1000 INJECTION, SOLUTION INTRAVENOUS
Qty: 0 | Refills: 0 | Status: DISCONTINUED | OUTPATIENT
Start: 2019-01-17 | End: 2019-01-17

## 2019-01-17 RX ADMIN — Medication 100 MILLIGRAM(S): at 15:34

## 2019-01-17 RX ADMIN — Medication 100 MILLIGRAM(S): at 22:01

## 2019-01-17 RX ADMIN — OXYCODONE HYDROCHLORIDE 5 MILLIGRAM(S): 5 TABLET ORAL at 22:35

## 2019-01-17 RX ADMIN — HEPARIN SODIUM 5000 UNIT(S): 5000 INJECTION INTRAVENOUS; SUBCUTANEOUS at 21:57

## 2019-01-17 RX ADMIN — HEPARIN SODIUM 5000 UNIT(S): 5000 INJECTION INTRAVENOUS; SUBCUTANEOUS at 15:34

## 2019-01-17 RX ADMIN — Medication 100 MILLIGRAM(S): at 05:52

## 2019-01-17 RX ADMIN — Medication 15 MILLIGRAM(S): at 15:34

## 2019-01-17 RX ADMIN — ERTAPENEM SODIUM 120 MILLIGRAM(S): 1 INJECTION, POWDER, LYOPHILIZED, FOR SOLUTION INTRAMUSCULAR; INTRAVENOUS at 19:26

## 2019-01-17 RX ADMIN — Medication 15 MILLIGRAM(S): at 02:10

## 2019-01-17 RX ADMIN — HEPARIN SODIUM 5000 UNIT(S): 5000 INJECTION INTRAVENOUS; SUBCUTANEOUS at 05:47

## 2019-01-17 RX ADMIN — OXYCODONE HYDROCHLORIDE 5 MILLIGRAM(S): 5 TABLET ORAL at 21:57

## 2019-01-17 RX ADMIN — Medication 15 MILLIGRAM(S): at 21:57

## 2019-01-17 RX ADMIN — Medication 15 MILLIGRAM(S): at 09:29

## 2019-01-17 NOTE — CONSULT NOTE ADULT - ASSESSMENT
53M treated for UTI in August, been followed for elevated PSA, biopsy showed prostate cancer.  Now s/p Robot-assisted laparoscopic radical prostatectomy on 1/16/19.

## 2019-01-17 NOTE — PROGRESS NOTE ADULT - SUBJECTIVE AND OBJECTIVE BOX
ANESTHESIA POSTOP CHECK    53y Male POSTOP DAY 1 S/P     Vital Signs Last 24 Hrs  T(C): 36.6 (17 Jan 2019 11:12), Max: 37 (16 Jan 2019 14:23)  T(F): 97.8 (17 Jan 2019 11:12), Max: 98.6 (16 Jan 2019 14:23)  HR: 64 (17 Jan 2019 11:12) (64 - 89)  BP: 145/66 (17 Jan 2019 11:12) (137/55 - 173/75)  BP(mean): 91 (16 Jan 2019 22:30) (85 - 98)  RR: 16 (17 Jan 2019 11:12) (10 - 17)  SpO2: 100% (17 Jan 2019 11:12) (95% - 100%)  I&O's Summary    16 Jan 2019 07:01  -  17 Jan 2019 07:00  --------------------------------------------------------  IN: 925 mL / OUT: 1855 mL / NET: -930 mL    17 Jan 2019 07:01  -  17 Jan 2019 11:23  --------------------------------------------------------  IN: 0 mL / OUT: 1610 mL / NET: -1610 mL        [x ] NO APPARENT ANESTHESIA COMPLICATIONS      Comments:

## 2019-01-17 NOTE — CONSULT NOTE ADULT - PROBLEM SELECTOR RECOMMENDATION 4
post op management per urology, encourage ambulation, await return of bowel function, pain control, IVFs, IS, DVT ppx (SC hep)

## 2019-01-17 NOTE — PROGRESS NOTE ADULT - SUBJECTIVE AND OBJECTIVE BOX
Urology Daily Progress Note    SUBJECTIVE:  Pt seen and examined, and is resting comfortably in bed. No acute events overnight. Pain is adequately controlled on current regimen. Pt has no complaints at this time. Negative for flatus and BM.     OBJECTIVE:  Vital Signs Last 24 Hrs  T(C): 36.7 (17 Jan 2019 05:45), Max: 37 (16 Jan 2019 14:23)  T(F): 98.1 (17 Jan 2019 05:45), Max: 98.6 (16 Jan 2019 14:23)  HR: 88 (17 Jan 2019 05:45) (66 - 89)  BP: 137/55 (17 Jan 2019 05:45) (137/55 - 173/75)  BP(mean): 91 (16 Jan 2019 22:30) (85 - 98)  RR: 16 (17 Jan 2019 05:45) (10 - 17)  SpO2: 96% (17 Jan 2019 05:45) (95% - 100%)    I&O's Detail    16 Jan 2019 07:01  -  17 Jan 2019 07:00  --------------------------------------------------------  IN:    IV PiggyBack: 50 mL    lactated ringers.: 375 mL    Oral Fluid: 500 mL  Total IN: 925 mL    OUT:    Bulb: 225 mL    Indwelling Catheter - Urethral: 830 mL    Voided: 800 mL  Total OUT: 1855 mL    Total NET: -930 mL      17 Jan 2019 07:01  -  17 Jan 2019 10:26  --------------------------------------------------------  IN:  Total IN: 0 mL    OUT:    Indwelling Catheter - Urethral: 1200 mL  Total OUT: 1200 mL    Total NET: -1200 mL        Exam:  GEN: A&Ox3, NAD  HEENT: atraumatic, normocephalic  RESP: no increased work of breathing, no use of accessory muscles  GI/ABD: soft, appropriately tender, mildly distended, no rebound or guarding, incisions c/d/i, NICOLAS is serosanguinous   : Roberto with clear pink urine   EXTREMITIES: warm, pink, well-perfused                        12.0   8.02  )-----------( 178      ( 17 Jan 2019 06:47 )             36.6       01-17    140  |  102  |  17  ----------------------------<  137<H>  4.4   |  25  |  1.05    Ca    8.3<L>      17 Jan 2019 06:47    TPro  7.6  /  Alb  4.7  /  TBili  0.5  /  DBili  x   /  AST  17  /  ALT  29  /  AlkPhos  75  01-15      PT/INR - ( 15 Amandeep 2019 11:01 )   PT: 11.1 SEC;   INR: 1.00          PTT - ( 15 Amandeep 2019 11:01 )  PTT:29.3 SEC Urology Daily Progress Note    SUBJECTIVE:  Pt seen and examined, and is resting comfortably in bed. No acute events overnight. Pain is adequately controlled on current regimen. Pt has no complaints at this time. Negative for flatus and BM.     OBJECTIVE:  Vital Signs Last 24 Hrs  T(C): 36.7 (17 Jan 2019 05:45), Max: 37 (16 Jan 2019 14:23)  T(F): 98.1 (17 Jan 2019 05:45), Max: 98.6 (16 Jan 2019 14:23)  HR: 88 (17 Jan 2019 05:45) (66 - 89)  BP: 137/55 (17 Jan 2019 05:45) (137/55 - 173/75)  BP(mean): 91 (16 Jan 2019 22:30) (85 - 98)  RR: 16 (17 Jan 2019 05:45) (10 - 17)  SpO2: 96% (17 Jan 2019 05:45) (95% - 100%)    I&O's Detail    16 Jan 2019 07:01  -  17 Jan 2019 07:00  --------------------------------------------------------  IN:    IV PiggyBack: 50 mL    lactated ringers.: 375 mL    Oral Fluid: 500 mL  Total IN: 925 mL    OUT:    Bulb: 225 mL    Indwelling Catheter - Urethral: 830 mL    Voided: 800 mL  Total OUT: 1855 mL    Total NET: -930 mL      17 Jan 2019 07:01  -  17 Jan 2019 10:26  --------------------------------------------------------  IN:  Total IN: 0 mL    OUT:    Indwelling Catheter - Urethral: 1200 mL  Total OUT: 1200 mL    Total NET: -1200 mL        Exam:  GEN: A&Ox3, NAD  HEENT: atraumatic, normocephalic  RESP: no increased work of breathing, no use of accessory muscles  GI/ABD: soft, appropriately tender, minimallydistended, no rebound or guarding, incisions c/d/i, NICOLAS is serosanguinous   : Roberto with clear pink urine   EXTREMITIES: warm, pink, well-perfused                        12.0   8.02  )-----------( 178      ( 17 Jan 2019 06:47 )             36.6       01-17    140  |  102  |  17  ----------------------------<  137<H>  4.4   |  25  |  1.05    Ca    8.3<L>      17 Jan 2019 06:47    TPro  7.6  /  Alb  4.7  /  TBili  0.5  /  DBili  x   /  AST  17  /  ALT  29  /  AlkPhos  75  01-15      PT/INR - ( 15 Amandeep 2019 11:01 )   PT: 11.1 SEC;   INR: 1.00          PTT - ( 15 Amandeep 2019 11:01 )  PTT:29.3 SEC

## 2019-01-17 NOTE — CONSULT NOTE ADULT - SUBJECTIVE AND OBJECTIVE BOX
Patient is a 53y old  Male who presents with a chief complaint of prostate CA (2019 23:04)    HPI:  53M treated for UTI in August, been followed for elevated PSA, biopsy showed prostate cancer.  Admitted 1/15 for ESBL in urine. Now s/p Robot-assisted laparoscopic radical prostatectomy on 19.  C/o pain at surgical sites, worse with movement, better after pain meds.  No flatus, been up walking.  No chest pain, SOB, nausea, vomiting.  Prior to admission no fever, chills, dysuria.    Allergies:  No Known Allergies    HOME MEDICATIONS:  none    MEDICATIONS  (STANDING):  docusate sodium 100 milliGRAM(s) Oral three times a day  ertapenem  IVPB      ertapenem  IVPB 1000 milliGRAM(s) IV Intermittent every 24 hours  heparin  Injectable 5000 Unit(s) SubCutaneous every 8 hours  ketorolac   Injectable 15 milliGRAM(s) IV Push every 6 hours    MEDICATIONS  (PRN):  acetaminophen   Tablet .. 650 milliGRAM(s) Oral every 6 hours PRN Mild Pain (1 - 3)  ondansetron Injectable 4 milliGRAM(s) IV Push once PRN Nausea and/or Vomiting  ondansetron Injectable 4 milliGRAM(s) IV Push every 6 hours PRN Nausea and/or Vomiting  oxybutynin 5 milliGRAM(s) Oral three times a day PRN Bladder spasms  oxyCODONE    IR 5 milliGRAM(s) Oral every 6 hours PRN Moderate Pain (4 - 6)  oxyCODONE    IR 10 milliGRAM(s) Oral every 6 hours PRN Severe Pain (7 - 10)  senna 2 Tablet(s) Oral at bedtime PRN Constipation    PAST MEDICAL & SURGICAL HISTORY:  Prostate cancer  No significant past surgical history    SOCIAL HISTORY:  , 2 children, works in construction  no tob/drugs, rare alcohol    FAMILY HISTORY:  father  when pt was young, not sure why  mother  stomach cancer    REVIEW OF SYSTEMS:  CONSTITUTIONAL: No fever, weight loss, or fatigue  EYES: No eye pain, visual disturbances, or discharge  ENMT:  No difficulty hearing, tinnitus, vertigo; No sinus or throat pain  NECK: No pain or stiffness  BREASTS: No pain, masses, or nipple discharge  RESPIRATORY: No cough, wheezing, chills or hemoptysis; No shortness of breath  CARDIOVASCULAR: No chest pain, palpitations, dizziness, or leg swelling  GASTROINTESTINAL: No nausea, vomiting, or hematemesis; No diarrhea or constipation. No melena or hematochezia.  GENITOURINARY: see HPI  NEUROLOGICAL: No headaches, memory loss, loss of strength, numbness, or tremors  SKIN: No itching, burning, rashes, or lesions   LYMPH NODES: No enlarged glands  ENDOCRINE: No heat or cold intolerance; No hair loss  MUSCULOSKELETAL: No muscle or back pain  PSYCHIATRIC: No depression, anxiety, mood swings, or difficulty sleeping  HEME/LYMPH: No easy bruising, or bleeding gums  ALLERGY AND IMMUNOLOGIC: No hives or eczema  [ X ] All other ROS negative  [  ] Unable to obtain due to poor mental status    Vital Signs Last 24 Hrs  T(C): 37 (2019 17:14), Max: 37 (2019 17:14)  T(F): 98.6 (2019 17:14), Max: 98.6 (2019 17:14)  HR: 76 (2019 17:14) (64 - 89)  BP: 149/50 (2019 17:14) (137/55 - 173/75)  BP(mean): 91 (2019 22:30) (85 - 98)  RR: 17 (2019 17:14) (10 - 17)  SpO2: 97% (2019 17:14) (95% - 100%)    PHYSICAL EXAM:  GENERAL: sitting up in bed, NAD  HEAD:  Atraumatic, Normocephalic  EYES: EOMI, PERRLA, conjunctiva and sclera clear  ENMT: Moist mucous membranes  NECK: Supple, No JVD  RESPIRATORY: Clear to auscultation bilaterally; No rales, rhonchi, wheezing, or rubs  CARDIOVASCULAR: Regular rate and rhythm; No murmurs, rubs, or gallops  GASTROINTESTINAL: Softly distended, incisions intact, +NICOLAS  GENITOURINARY: +enamorado  EXTREMITIES:  2+ Peripheral Pulses, No clubbing, cyanosis, or edema  NERVOUS SYSTEM:  Alert & Oriented X3; Moving all 4 extremities; No gross sensory deficits  HEME/LYMPH: No lymphadenopathy noted  SKIN: No rashes or lesions  PSYCH: calm, appropriate    LABS:             12.0   8.02  )-----------( 178      ( 2019 06:47 )             36.6     140  |  102  |  17  ----------------------------<  137<H>  4.4   |  25  |  1.05    Ca    8.3<L>      2019 06:47    RADIOLOGY & ADDITIONAL STUDIES:    EKG:   Personally Reviewed:  [ ] YES     Imaging:   Personally Reviewed:  [ ] YES               Consultant(s) notes reviewed:    Care Discussed with Consultant(s)/Other Providers:  urology re overall care

## 2019-01-18 ENCOUNTER — TRANSCRIPTION ENCOUNTER (OUTPATIENT)
Age: 54
End: 2019-01-18

## 2019-01-18 VITALS
DIASTOLIC BLOOD PRESSURE: 55 MMHG | TEMPERATURE: 98 F | SYSTOLIC BLOOD PRESSURE: 138 MMHG | OXYGEN SATURATION: 100 % | RESPIRATION RATE: 16 BRPM | HEART RATE: 73 BPM

## 2019-01-18 LAB
-  AMIKACIN: SIGNIFICANT CHANGE UP
-  AMPICILLIN/SULBACTAM: SIGNIFICANT CHANGE UP
-  AMPICILLIN: SIGNIFICANT CHANGE UP
-  AZTREONAM: SIGNIFICANT CHANGE UP
-  CEFAZOLIN: SIGNIFICANT CHANGE UP
-  CEFEPIME: SIGNIFICANT CHANGE UP
-  CEFOXITIN: SIGNIFICANT CHANGE UP
-  CEFTAZIDIME: SIGNIFICANT CHANGE UP
-  CEFTRIAXONE: SIGNIFICANT CHANGE UP
-  CIPROFLOXACIN: SIGNIFICANT CHANGE UP
-  ERTAPENEM: SIGNIFICANT CHANGE UP
-  GENTAMICIN: SIGNIFICANT CHANGE UP
-  IMIPENEM: SIGNIFICANT CHANGE UP
-  LEVOFLOXACIN: SIGNIFICANT CHANGE UP
-  MEROPENEM: SIGNIFICANT CHANGE UP
-  NITROFURANTOIN: SIGNIFICANT CHANGE UP
-  PIPERACILLIN/TAZOBACTAM: SIGNIFICANT CHANGE UP
-  TIGECYCLINE: SIGNIFICANT CHANGE UP
-  TOBRAMYCIN: SIGNIFICANT CHANGE UP
-  TRIMETHOPRIM/SULFAMETHOXAZOLE: SIGNIFICANT CHANGE UP
BACTERIA UR CULT: SIGNIFICANT CHANGE UP
HCT VFR BLD CALC: 32.4 % — LOW (ref 39–50)
HGB BLD-MCNC: 10.9 G/DL — LOW (ref 13–17)
MCHC RBC-ENTMCNC: 29.2 PG — SIGNIFICANT CHANGE UP (ref 27–34)
MCHC RBC-ENTMCNC: 33.6 % — SIGNIFICANT CHANGE UP (ref 32–36)
MCV RBC AUTO: 86.9 FL — SIGNIFICANT CHANGE UP (ref 80–100)
METHOD TYPE: SIGNIFICANT CHANGE UP
NRBC # FLD: 0 K/UL — LOW (ref 25–125)
ORGANISM # SPEC MICROSCOPIC CNT: SIGNIFICANT CHANGE UP
ORGANISM # SPEC MICROSCOPIC CNT: SIGNIFICANT CHANGE UP
PLATELET # BLD AUTO: 147 K/UL — LOW (ref 150–400)
PMV BLD: 10.9 FL — SIGNIFICANT CHANGE UP (ref 7–13)
RBC # BLD: 3.73 M/UL — LOW (ref 4.2–5.8)
RBC # FLD: 13 % — SIGNIFICANT CHANGE UP (ref 10.3–14.5)
WBC # BLD: 7.25 K/UL — SIGNIFICANT CHANGE UP (ref 3.8–10.5)
WBC # FLD AUTO: 7.25 K/UL — SIGNIFICANT CHANGE UP (ref 3.8–10.5)

## 2019-01-18 PROCEDURE — 99233 SBSQ HOSP IP/OBS HIGH 50: CPT | Mod: GC

## 2019-01-18 RX ORDER — OXYBUTYNIN CHLORIDE 5 MG
1 TABLET ORAL
Qty: 15 | Refills: 0 | OUTPATIENT
Start: 2019-01-18

## 2019-01-18 RX ORDER — DOCUSATE SODIUM 100 MG
1 CAPSULE ORAL
Qty: 0 | Refills: 0 | COMMUNITY
Start: 2019-01-18

## 2019-01-18 RX ORDER — FOSFOMYCIN TROMETHAMINE 3 G/1
3 POWDER ORAL ONCE
Qty: 0 | Refills: 0 | Status: COMPLETED | OUTPATIENT
Start: 2019-01-18 | End: 2019-01-18

## 2019-01-18 RX ORDER — ACETAMINOPHEN 500 MG
2 TABLET ORAL
Qty: 0 | Refills: 0 | COMMUNITY
Start: 2019-01-18

## 2019-01-18 RX ORDER — SENNA PLUS 8.6 MG/1
2 TABLET ORAL
Qty: 0 | Refills: 0 | COMMUNITY
Start: 2019-01-18

## 2019-01-18 RX ORDER — OXYCODONE HYDROCHLORIDE 5 MG/1
1 TABLET ORAL
Qty: 8 | Refills: 0 | OUTPATIENT
Start: 2019-01-18

## 2019-01-18 RX ADMIN — ERTAPENEM SODIUM 120 MILLIGRAM(S): 1 INJECTION, POWDER, LYOPHILIZED, FOR SOLUTION INTRAMUSCULAR; INTRAVENOUS at 12:31

## 2019-01-18 RX ADMIN — Medication 100 MILLIGRAM(S): at 15:41

## 2019-01-18 RX ADMIN — HEPARIN SODIUM 5000 UNIT(S): 5000 INJECTION INTRAVENOUS; SUBCUTANEOUS at 05:30

## 2019-01-18 RX ADMIN — Medication 15 MILLIGRAM(S): at 03:25

## 2019-01-18 RX ADMIN — FOSFOMYCIN TROMETHAMINE 3 GRAM(S): 3 POWDER ORAL at 12:32

## 2019-01-18 RX ADMIN — Medication 15 MILLIGRAM(S): at 10:11

## 2019-01-18 RX ADMIN — Medication 100 MILLIGRAM(S): at 05:30

## 2019-01-18 NOTE — DISCHARGE NOTE ADULT - HOSPITAL COURSE
52 yo M admitted 1/15/19 for preoperative antibiotics (Ertapenem) for ESBL Ecoli prior to planned RALP/LND on 1/16.  Pt underwent RALP/LND on 1/16/19.  Postoperative course uneventful, pain controlled, urine remained acceptable in color, ambulating, remained afebrile and hemodynamically stable.  Return of GI function on POD #2, diet advanced without incident.  NICOLAS Cr = serum, NICOLAS d/c and pt d/c with enamorado to leg bag after dose of fosfomycin on 1/18/19 to f/u with Dr. Santana.  I-stop checked.

## 2019-01-18 NOTE — DISCHARGE NOTE ADULT - CARE PLAN
Principal Discharge DX:	Prostate cancer  Goal:	Pain control  Assessment and plan of treatment:	Empty enamorado bag as needed as instructed.  Drink plenty of fluids.  No heavy lifting or straining for 4 to 6 weeks, avoid constipation. You may have intermittent pink tinged urine and small amounts of leakage around enamorado (due to bladder spasms).  This is normal.   If your urine becomes bright red or with clots, or there is no urine in the bag, please call the office. You may shower, just pat white strips dry, they will fall off in a few weeks. Change dressing at drain site daily or as needed until dry.  Call Dr. Santana's officevto schedule a follow up appointment next week for enamorado removal and further management.  Call the office if you have fever greater than 101, no urine in bag, pain not relieved with pain medication, nausea/vomiting.

## 2019-01-18 NOTE — DISCHARGE NOTE ADULT - MEDICATION SUMMARY - MEDICATIONS TO TAKE
I will START or STAY ON the medications listed below when I get home from the hospital:    acetaminophen 325 mg oral tablet  -- 2 tab(s) by mouth every 6 hours, As needed, Mild Pain (1 - 3)  -- Indication: For Mild pain    oxyCODONE 5 mg oral tablet  -- 1 to 2 tab(s) by mouth every 4 to 6 hours, As needed, for Moderate to Severe Pain MDD:3  -- Indication: For Moderate to severe pain    senna oral tablet  -- 2 tab(s) by mouth once a day (at bedtime), As needed, Constipation  -- Indication: For Constipation    docusate sodium 100 mg oral capsule  -- 1 cap(s) by mouth 3 times a day  -- Indication: For Constipation    oxybutynin 5 mg oral tablet  -- 1 tab(s) by mouth 3 times a day, As needed, Bladder spasms.  Stop taking the day before your follow up appointment with Dr. Santana.  -- Indication: For Bladder spasms, stop taking the day before your follow up appointment with Dr. Santana

## 2019-01-18 NOTE — PROGRESS NOTE ADULT - PROBLEM SELECTOR PLAN 1
Strict I&O's  Analgesia Antiemetics  DVT prophylaxis  Incentive spirometry  Clears / OOB  AM labs  Cont Abx
ESBL Ecoli - d4 ertapenem, d/c on one day fosfomycin

## 2019-01-18 NOTE — PROGRESS NOTE ADULT - ASSESSMENT
53 y male s/p RALP
53M POD#1 s/p RALP w/ b/l LND    Plan:   - AM labs reviewed  - Continue senna colace  - Nothing per rectum  - Continue Roberto  - Monitor UOP   - Await GIF  - OOB  - Continue ertapenem while inhouse, do not anticipate being discharged with abx
53M POD#2 s/p RALP w/ b/l LND    Plan:   - AM CBC reviewed  - Continue senna colace  - Regular diet  - Nothing per rectum  - Continue Enamorado, enamorado/leg bag teaching  - OOB  - Continue ertapenem while inhouse, d/c on one dose of fosfomycin  - D/C home
53M POD#3 s/p RALP w/ b/l LND    Plan:   - AM CBC reviewed  - Continue senna colace  - Regular diet  - Nothing per rectum  - Continue Enamorado, enamorado/leg bag teaching  - OOB  - Continue ertapenem while inhouse, d/c on one dose of fosfomycin  - D/C home
53M treated for UTI in August, been followed for elevated PSA, biopsy showed prostate cancer.  Now s/p Robot-assisted laparoscopic radical prostatectomy on 1/16/19.

## 2019-01-18 NOTE — DISCHARGE NOTE ADULT - CONDITIONS AT DISCHARGE
A&Ox4 VSS Pain managed with analgesics. Denies chest pain/SOB. Abdominal scope sites CDI. NVS+ Selena PO, denies n/v. Roberto to leg bag, instructions given. OOB with standby assist. No s/s of distress. Family at bedside. Pt ready for d/c home.

## 2019-01-18 NOTE — DISCHARGE NOTE ADULT - CARE PROVIDER_API CALL
April Santana), Urology  [address] [place] [address]  Phone: [phone]  Fax: [phone]

## 2019-01-18 NOTE — DISCHARGE NOTE ADULT - INSTRUCTIONS
Call your doctor for a follow-up appointment. Call your doctor's office for temperature greater than 100.4, for redness/drainage/swelling at your surgical sites, for persistent nausea/vomiting, for pain unrelieved by pain medication. No heavy lifting or straining. Take over the counter softeners to prevent constipation, which is a side effect of pain medication. Drink plenty of fluids. Do not remove the steri strips, they will fall off on their own; if they get wet, you may pat dry. Keep well hydrated Call your doctor for a follow-up appointment. Call your doctor's office for temperature greater than 101, for redness/drainage/swelling at your surgical sites, for persistent nausea/vomiting, for pain unrelieved by pain medication. No heavy lifting or straining. Take over the counter softeners to prevent constipation, which is a side effect of pain medication. Drink plenty of fluids. Do not remove the steri strips, they will fall off on their own; if they get wet, you may pat dry.

## 2019-01-18 NOTE — DISCHARGE NOTE ADULT - PATIENT PORTAL LINK FT
You can access the [facility] Patient Portal, offered by [facility], by registering with the following website: [email or web address]

## 2019-01-18 NOTE — PROGRESS NOTE ADULT - SUBJECTIVE AND OBJECTIVE BOX
Overnight events:  None    Subjective:  Pt offers no complaints, pain controlled, + flatus/BM    Objective:    Vital signs  T(C): , Max: 37.1 (01-17-19 @ 21:55)  HR: 87 (01-18-19 @ 05:27)  BP: 117/59 (01-18-19 @ 05:27)  SpO2: 97% (01-18-19 @ 05:27)  Wt(kg): --    Output   Kelsea: 1350 yellow  NICOLAS: 43      Gen: NAD  Abd: soft, nontender, nondistended, NICOLAS removed  : kelsea secured    Labs                        10.9   7.25  )-----------( 147      ( 18 Jan 2019 05:56 )             32.4     17 Jan 2019 06:47    140    |  102    |  17     ----------------------------<  137    4.4     |  25     |  1.05     Ca    8.3        17 Jan 2019 06:47

## 2019-01-18 NOTE — PROGRESS NOTE ADULT - SUBJECTIVE AND OBJECTIVE BOX
Overnight events:  None    Subjective:  Pt feels well, + flatus/BM, ambulating without difficulty    Objective:    Vital signs  T(C): , Max: 37.1 (01-17-19 @ 21:55)  HR: 73 (01-18-19 @ 10:03)  BP: 138/55 (01-18-19 @ 10:03)  SpO2: 100% (01-18-19 @ 10:03)  Wt(kg): --    Output   Enamorado: 1350  NICOLAS: 43      Gen: NAD  Abd: brandon sprague c/d/i, soft, nontender, NICOLAS removed on rounds  : enamorado secured    Labs                        10.9   7.25  )-----------( 147      ( 18 Jan 2019 05:56 )             32.4         Urine Cx:   Culture - Urine (01.15.19 @ 13:35)    Culture - Urine:   EC^Escherichia coli  COLONY COUNT: 10,000-49,000 CFU/mL    Specimen Source: URINE MIDSTREAM

## 2019-01-18 NOTE — PROGRESS NOTE ADULT - SUBJECTIVE AND OBJECTIVE BOX
Patient is a 53y old  Male who presents with a chief complaint of surgery (18 Jan 2019 11:39)    SUBJECTIVE / OVERNIGHT EVENTS:  Doing well. Tolerating clears.  +flatus/BM. Up walking without dizziness.  Pain controlled.    MEDICATIONS  (STANDING):  docusate sodium 100 milliGRAM(s) Oral three times a day  ertapenem  IVPB      ertapenem  IVPB 1000 milliGRAM(s) IV Intermittent every 24 hours  heparin  Injectable 5000 Unit(s) SubCutaneous every 8 hours    MEDICATIONS  (PRN):  acetaminophen   Tablet .. 650 milliGRAM(s) Oral every 6 hours PRN Mild Pain (1 - 3)  ondansetron Injectable 4 milliGRAM(s) IV Push once PRN Nausea and/or Vomiting  ondansetron Injectable 4 milliGRAM(s) IV Push every 6 hours PRN Nausea and/or Vomiting  oxybutynin 5 milliGRAM(s) Oral three times a day PRN Bladder spasms  oxyCODONE    IR 5 milliGRAM(s) Oral every 6 hours PRN Moderate Pain (4 - 6)  oxyCODONE    IR 10 milliGRAM(s) Oral every 6 hours PRN Severe Pain (7 - 10)  senna 2 Tablet(s) Oral at bedtime PRN Constipation    Vital Signs Last 24 Hrs  T(C): 36.8 (18 Jan 2019 10:03), Max: 37.1 (17 Jan 2019 21:55)  T(F): 98.2 (18 Jan 2019 10:03), Max: 98.8 (17 Jan 2019 21:55)  HR: 73 (18 Jan 2019 10:03) (73 - 91)  BP: 138/55 (18 Jan 2019 10:03) (117/59 - 149/50)  RR: 16 (18 Jan 2019 10:03) (15 - 17)  SpO2: 100% (18 Jan 2019 10:03) (97% - 100%)    PHYSICAL EXAM:  GENERAL: NAD, lying flat in bed NAD  HEAD:  Atraumatic, Normocephalic  EYES: EOMI, PERRLA, conjunctiva and sclera clear  NECK: Supple, No JVD  CHEST/LUNG: Clear to auscultation bilaterally; No wheeze  HEART: Regular rate and rhythm; No murmurs, rubs, or gallops  ABDOMEN: Soft, Nontender, Nondistended; Bowel sounds present  EXTREMITIES:  2+ Peripheral Pulses, No clubbing, cyanosis, or edema  PSYCH: AAOx3  NEUROLOGY: non-focal  SKIN: No rashes or lesions  : +[place]    LABS:             10.9   7.25  )-----------( 147      ( 18 Jan 2019 05:56 )             32.4     140  |  102  |  17  ----------------------------<  137<H>  4.4   |  25  |  1.05    Ca    8.3<L>      17 Jan 2019 06:47    RADIOLOGY & ADDITIONAL TESTS:    Imaging Personally Reviewed:    Consultant(s) Notes Reviewed:      Care Discussed with Consultants/Other Providers: urology re overall care

## 2019-01-23 LAB — SURGICAL PATHOLOGY STUDY: SIGNIFICANT CHANGE UP

## 2019-01-25 ENCOUNTER — APPOINTMENT (OUTPATIENT)
Dept: UROLOGY | Facility: CLINIC | Age: 54
End: 2019-01-25
Payer: MEDICAID

## 2019-01-25 ENCOUNTER — OUTPATIENT (OUTPATIENT)
Dept: OUTPATIENT SERVICES | Facility: HOSPITAL | Age: 54
LOS: 1 days | End: 2019-01-25
Payer: MEDICAID

## 2019-01-25 DIAGNOSIS — R97.20 ELEVATED PROSTATE, SPECIFIC ANTIGEN [PSA]: ICD-10-CM

## 2019-01-25 DIAGNOSIS — R35.0 FREQUENCY OF MICTURITION: ICD-10-CM

## 2019-01-25 PROCEDURE — 96372 THER/PROPH/DIAG INJ SC/IM: CPT

## 2019-01-25 PROCEDURE — 99024 POSTOP FOLLOW-UP VISIT: CPT

## 2019-01-25 RX ORDER — AMIKACIN SULFATE 250 MG/ML
500 INJECTION, SOLUTION INTRAMUSCULAR; INTRAVENOUS
Qty: 2 | Refills: 0 | Status: COMPLETED | OUTPATIENT
Start: 2019-01-25 | End: 2019-01-25

## 2019-01-25 RX ORDER — AMIKACIN SULFATE 250 MG/ML
500 INJECTION, SOLUTION INTRAMUSCULAR; INTRAVENOUS
Refills: 0 | Status: COMPLETED | OUTPATIENT
Start: 2019-01-25

## 2019-01-25 RX ADMIN — AMIKACIN SULFATE 0 MG/2ML: 500 INJECTION, SOLUTION INTRAMUSCULAR; INTRAVENOUS at 00:00

## 2019-02-08 DIAGNOSIS — C61 MALIGNANT NEOPLASM OF PROSTATE: ICD-10-CM

## 2019-05-17 ENCOUNTER — APPOINTMENT (OUTPATIENT)
Dept: UROLOGY | Facility: CLINIC | Age: 54
End: 2019-05-17
Payer: MEDICAID

## 2019-05-17 PROCEDURE — 99214 OFFICE O/P EST MOD 30 MIN: CPT

## 2019-05-17 NOTE — HISTORY OF PRESENT ILLNESS
[FreeTextEntry1] : 53yo gentleman with cc of prostate ca. Pt initially with hx of dysuria, increased urinary frequency, increased urgency, new hesitancy. No feelings of incomplete emptying. No fevers or chills. Went to see PCP. Had UCx that showed EColi. Was treated with antibiotic (? bactrim) for 1 week and felt better on it but sx remain now that he is off. At that time, also had CBC that showed leukocytosis to 14. Also checked PSA which was elevated to 20. No clear issues with constipation. His UCx was positive and he was treated with cipro. Sx returned to baseline. PSA improved to 6.52 but was still elevated and REBEKAH showed nodule at apex. He underwent bx that returned with prognostic grade 1 and 2 prostate ca in 5 of 12 cores involving 20-80%. Left apical bx (in area of abnormality on REBEKAH) with high volume, grade 2 disease. Discussed the need for treatment and potential management options. Ordered MRI for eval which showed 2.1cm PiRAD 5 lesion with no gross JATIN but broad tumor-capsular contact and capsular bulging. No LAD. No family hx of prostate ca. At baseline, no urinary complaints. \par \par Discussed management and pt elected robotic radical prostatectomy with bilateral pelvic lymph node dissection. Non-nerve sparing on left. Uncomplicated procedure. Did have pre-op ESBL in urine requiring IV abx. Pathology which showed pT2 (R0) N0 prognostic grade 3 (upgraded) prostate cancer. \par \par Pt returns today for follow-up. His urine control is excellent. He is having occasional stress incontinence. This is not daily. He is not wearing a pad for this. He reports having significant ED. He is not having nocturnal erections. No issues with ED prior. Had non nerve sparing on L. \par \par Daughter did interpretation today per pt request.

## 2019-05-17 NOTE — ASSESSMENT
[FreeTextEntry1] : pT2 prognostic grade 3 prostate ca s/p RALRP in 1/2019. DOing well\par --Kegel exercises for recovery of urinary function\par --PSA today\par --May resume sexual acitivity\par --Trial of sildenadil\par --RTC in 3mo with PSA, extend to 6mo if these f/u fine

## 2019-05-17 NOTE — PHYSICAL EXAM
[General Appearance - Well Developed] : well developed [General Appearance - In No Acute Distress] : no acute distress [General Appearance - Well Nourished] : well nourished [Abdomen Soft] : soft [Costovertebral Angle Tenderness] : no ~M costovertebral angle tenderness [Skin Color & Pigmentation] : normal skin color and pigmentation [Edema] : no peripheral edema [Exaggerated Use Of Accessory Muscles For Inspiration] : no accessory muscle use [Oriented To Time, Place, And Person] : oriented to person, place, and time [Normal Station and Gait] : the gait and station were normal for the patient's age [No Focal Deficits] : no focal deficits [FreeTextEntry1] : incisions healing well

## 2019-05-17 NOTE — REVIEW OF SYSTEMS
[Fever] : fever [Chills] : chills [Abdominal Pain] : abdominal pain [Pain during urination] : pain during urination [Strain or push to urinate] : strain or push to urinate [Wake up at night to urinate  How many times?  ___] : wakes up to urinate [unfilled] times during the night [Strong urge to urinate] : strong urge to urinate [Wait a long time to urinate] : waits a long time to urinate [Slow urine stream] : slow urine stream [Interrupted urine stream] : interrupted urine stream [Negative] : Heme/Lymph

## 2019-05-20 LAB — PSA SERPL-MCNC: <0.01 NG/ML

## 2019-08-14 ENCOUNTER — APPOINTMENT (OUTPATIENT)
Dept: UROLOGY | Facility: CLINIC | Age: 54
End: 2019-08-14
Payer: COMMERCIAL

## 2019-08-14 PROCEDURE — 99214 OFFICE O/P EST MOD 30 MIN: CPT

## 2019-08-14 NOTE — REVIEW OF SYSTEMS
[Chills] : chills [Fever] : fever [Pain during urination] : pain during urination [Abdominal Pain] : abdominal pain [Wake up at night to urinate  How many times?  ___] : wakes up to urinate [unfilled] times during the night [Strong urge to urinate] : strong urge to urinate [Wait a long time to urinate] : waits a long time to urinate [Strain or push to urinate] : strain or push to urinate [Slow urine stream] : slow urine stream [Interrupted urine stream] : interrupted urine stream [Negative] : Heme/Lymph

## 2019-08-14 NOTE — ASSESSMENT
[FreeTextEntry1] : pT2 prognostic grade 3 prostate ca s/p RALRP in 1/2019. DOing well\par --Kegel exercises for recovery of urinary function\par --PSA today\par --Refer to Dr Mills for ED\par --RTC in 6mo

## 2019-08-14 NOTE — PHYSICAL EXAM
[General Appearance - Well Developed] : well developed [General Appearance - Well Nourished] : well nourished [General Appearance - In No Acute Distress] : no acute distress [Abdomen Soft] : soft [Costovertebral Angle Tenderness] : no ~M costovertebral angle tenderness [Skin Color & Pigmentation] : normal skin color and pigmentation [Edema] : no peripheral edema [Exaggerated Use Of Accessory Muscles For Inspiration] : no accessory muscle use [Oriented To Time, Place, And Person] : oriented to person, place, and time [Normal Station and Gait] : the gait and station were normal for the patient's age [No Focal Deficits] : no focal deficits

## 2019-08-14 NOTE — HISTORY OF PRESENT ILLNESS
[FreeTextEntry1] : 53yo gentleman with cc of prostate ca. Pt initially with hx of dysuria, increased urinary frequency, increased urgency, new hesitancy. No feelings of incomplete emptying. No fevers or chills. Went to see PCP. Had UCx that showed EColi. At that time, also had CBC that showed leukocytosis to 14 and PSA was elevated to 20. His UCx was positive and he was treated with cipro and sx returned to baseline. PSA improved to 6.52 but was still elevated and REBEKAH showed nodule at apex. He underwent bx that returned with prognostic grade 1 and 2 prostate ca in 5 of 12 cores involving 20-80%. Ordered MRI for eval which showed 2.1cm PiRAD 5 lesion with no gross JATIN but broad tumor-capsular contact and capsular bulging. \par \par Discussed management and pt elected robotic radical prostatectomy with bilateral pelvic lymph node dissection. Non-nerve sparing on left. Uncomplicated procedure. Pathology which showed pT2 (R0) N0 prognostic grade 3 (upgraded) prostate cancer. Post surgery PSA was undetectable. \par \par Pt returns today for follow-up. He reports no bother from urinary sx. He is having occasional stress incontinence with sneeze or heavy lifting. He reports having significant ED. He is not having nocturnal erections. No issues with ED prior. Had non nerve sparing on L. Was given sildenafil trial at last visit and reports that when he uses it, he is able to get some rigidity but not enough for penetration. \par \par Interpretation today via interpretor #[id number].

## 2019-08-16 LAB
PSA FREE FLD-MCNC: NORMAL %
PSA FREE SERPL-MCNC: <0.01 NG/ML
PSA SERPL-MCNC: <0.01 NG/ML

## 2019-09-17 ENCOUNTER — LABORATORY RESULT (OUTPATIENT)
Age: 54
End: 2019-09-17

## 2019-09-17 ENCOUNTER — APPOINTMENT (OUTPATIENT)
Dept: UROLOGY | Facility: CLINIC | Age: 54
End: 2019-09-17
Payer: COMMERCIAL

## 2019-09-17 VITALS
SYSTOLIC BLOOD PRESSURE: 156 MMHG | DIASTOLIC BLOOD PRESSURE: 81 MMHG | RESPIRATION RATE: 16 BRPM | WEIGHT: 165 LBS | HEART RATE: 62 BPM | HEIGHT: 65 IN | BODY MASS INDEX: 27.49 KG/M2

## 2019-09-17 PROCEDURE — 99215 OFFICE O/P EST HI 40 MIN: CPT

## 2019-09-17 NOTE — HISTORY OF PRESENT ILLNESS
[FreeTextEntry1] : Patient is a 54-year-old gentleman who presents with a chief complaint of erectile dysfunction.  We reviewed the questionnaire he completed in detail. Patient had a radical prostatectomy a year and a half prior. He is unable to obtain an erection. His erections are not modified with the degree of sexual stimulation.   He states that his erections presently are often less than 0 out of 10 in both tumescence and rigidity, insufficient for penetration.   He often ejaculates through a flaccid phallus.  He has difficulty maintaining an erection. He describes a normal libido.  His sexual dysfunction occurs with both sexual relations and masturbation.  His erections are not improved with PDE5 inhibitors.   His partner is understanding and was unable to be with him at the visit today. He is .  \par \par His past medical history is as stated above.  In his present occupation he has no known toxin exposure. He does not smoke and drinks socially .  He has no known drug allergies. His review of systems and past medical history demonstrates no significant urologic issues (see patient completed questionnaire). His family history demonstrates no significant urologic issues.

## 2019-09-17 NOTE — ASSESSMENT
[FreeTextEntry1] : This pleasant  gentleman presents for evaluation of his sexual dysfunction. I have requested several blood studies.  Urine dip and microscopic analysis was requested . I have suggested a diagnostic injection and duplex ultrasound to evaluate his penile vasculature.  I will be better able to make more specific recommendations after additional test results return. \par 1. Review blood tests on follow up\par 2. Penile duplex study on follow up\par

## 2019-09-17 NOTE — PHYSICAL EXAM
[General Appearance - Well Developed] : well developed [General Appearance - Well Nourished] : well nourished [Normal Appearance] : normal appearance [Well Groomed] : well groomed [Heart Rate And Rhythm] : Heart rate and rhythm were normal [General Appearance - In No Acute Distress] : no acute distress [Arterial Pulses Normal] : the pedal pulses were normal [Edema] : no peripheral edema [Respiration, Rhythm And Depth] : normal respiratory rhythm and effort [Exaggerated Use Of Accessory Muscles For Inspiration] : no accessory muscle use [Auscultation Breath Sounds / Voice Sounds] : lungs were clear to auscultation bilaterally [Chest Palpation] : palpation of the chest revealed no abnormalities [Lungs Percussion] : the lungs were normal to percussion [Bowel Sounds] : normal bowel sounds [Abdomen Soft] : soft [Abdomen Tenderness] : non-tender [Abdomen Mass (___ Cm)] : no abdominal mass palpated [Abdomen Hernia] : no hernia was discovered [Costovertebral Angle Tenderness] : no ~M costovertebral angle tenderness [Urethral Meatus] : meatus normal [Urinary Bladder Findings] : the bladder was normal on palpation [Scrotum] : the scrotum was normal [Epididymis] : the epididymides were normal [Testes Tenderness] : no tenderness of the testes [Testes Mass (___cm)] : there were no testicular masses [Anus Abnormality] : the anus and perineum were normal [Normal Station and Gait] : the gait and station were normal for the patient's age [Skin Turgor] : supple [Skin Color & Pigmentation] : normal skin color and pigmentation [] : no rash [Skin Lesions] : no skin lesions [No Focal Deficits] : no focal deficits [Affect] : the affect was normal [Oriented To Time, Place, And Person] : oriented to person, place, and time [Mood] : the mood was normal [No Palpable Adenopathy] : no palpable adenopathy [Not Anxious] : not anxious [Cervical Lymph Nodes Enlarged Posterior Bilaterally] : posterior cervical [Cervical Lymph Nodes Enlarged Anterior Bilaterally] : anterior cervical [Supraclavicular Lymph Nodes Enlarged Bilaterally] : supraclavicular [Axillary Lymph Nodes Enlarged Bilaterally] : axillary [Inguinal Lymph Nodes Enlarged Bilaterally] : inguinal [Femoral Lymph Nodes Enlarged Bilaterally] : femoral [Penis Abnormality] : normal uncircumcised penis [FreeTextEntry1] : empty prostatic fossa

## 2019-09-23 LAB
25(OH)D3 SERPL-MCNC: 22.5 NG/ML
ALBUMIN SERPL ELPH-MCNC: 5.1 G/DL
ALP BLD-CCNC: 92 U/L
ALT SERPL-CCNC: 32 U/L
ANION GAP SERPL CALC-SCNC: 15 MMOL/L
APPEARANCE: CLEAR
AST SERPL-CCNC: 27 U/L
BASOPHILS # BLD AUTO: 0.05 K/UL
BASOPHILS NFR BLD AUTO: 0.7 %
BILIRUB SERPL-MCNC: 0.7 MG/DL
BILIRUBIN URINE: NEGATIVE
BLOOD URINE: NEGATIVE
BUN SERPL-MCNC: 12 MG/DL
CALCIUM SERPL-MCNC: 10.2 MG/DL
CHLORIDE SERPL-SCNC: 99 MMOL/L
CHOLEST SERPL-MCNC: 248 MG/DL
CHOLEST/HDLC SERPL: 5.9 RATIO
CO2 SERPL-SCNC: 25 MMOL/L
COLOR: NORMAL
CREAT SERPL-MCNC: 1.04 MG/DL
EOSINOPHIL # BLD AUTO: 0.08 K/UL
EOSINOPHIL NFR BLD AUTO: 1.1 %
ESTIMATED AVERAGE GLUCOSE: 111 MG/DL
ESTRADIOL SERPL-MCNC: 14 PG/ML
GLUCOSE QUALITATIVE U: NEGATIVE
GLUCOSE SERPL-MCNC: 87 MG/DL
HBA1C MFR BLD HPLC: 5.5 %
HCT VFR BLD CALC: 45 %
HDLC SERPL-MCNC: 42 MG/DL
HGB BLD-MCNC: 15.3 G/DL
IMM GRANULOCYTES NFR BLD AUTO: 0.3 %
KETONES URINE: NEGATIVE
LDLC SERPL CALC-MCNC: NORMAL MG/DL
LEUKOCYTE ESTERASE URINE: NEGATIVE
LH SERPL-ACNC: 4.2 IU/L
LYMPHOCYTES # BLD AUTO: 1.95 K/UL
LYMPHOCYTES NFR BLD AUTO: 26.7 %
MAN DIFF?: NORMAL
MCHC RBC-ENTMCNC: 29.6 PG
MCHC RBC-ENTMCNC: 34 GM/DL
MCV RBC AUTO: 87 FL
MONOCYTES # BLD AUTO: 0.6 K/UL
MONOCYTES NFR BLD AUTO: 8.2 %
NEUTROPHILS # BLD AUTO: 4.6 K/UL
NEUTROPHILS NFR BLD AUTO: 63 %
NITRITE URINE: NEGATIVE
PH URINE: 6
PLATELET # BLD AUTO: 199 K/UL
POTASSIUM SERPL-SCNC: 4.8 MMOL/L
PROT SERPL-MCNC: 7.7 G/DL
PROTEIN URINE: ABNORMAL
PSA SERPL-MCNC: <0.01 NG/ML
RBC # BLD: 5.17 M/UL
RBC # FLD: 12.5 %
SODIUM SERPL-SCNC: 139 MMOL/L
SPECIFIC GRAVITY URINE: 1.01
TESTOST BND SERPL-MCNC: 5.8 PG/ML
TESTOST SERPL-MCNC: 285.8 NG/DL
TRIGL SERPL-MCNC: 516 MG/DL
TSH SERPL-ACNC: 4.18 UIU/ML
UROBILINOGEN URINE: NORMAL
WBC # FLD AUTO: 7.3 K/UL

## 2019-10-08 ENCOUNTER — OUTPATIENT (OUTPATIENT)
Dept: OUTPATIENT SERVICES | Facility: HOSPITAL | Age: 54
LOS: 1 days | End: 2019-10-08
Payer: COMMERCIAL

## 2019-10-08 ENCOUNTER — APPOINTMENT (OUTPATIENT)
Dept: UROLOGY | Facility: CLINIC | Age: 54
End: 2019-10-08
Payer: COMMERCIAL

## 2019-10-08 VITALS
SYSTOLIC BLOOD PRESSURE: 174 MMHG | HEIGHT: 65 IN | WEIGHT: 165 LBS | RESPIRATION RATE: 16 BRPM | BODY MASS INDEX: 27.49 KG/M2 | HEART RATE: 69 BPM | DIASTOLIC BLOOD PRESSURE: 91 MMHG

## 2019-10-08 VITALS
BODY MASS INDEX: 27.49 KG/M2 | RESPIRATION RATE: 16 BRPM | WEIGHT: 165 LBS | HEART RATE: 79 BPM | HEIGHT: 65 IN | SYSTOLIC BLOOD PRESSURE: 178 MMHG | DIASTOLIC BLOOD PRESSURE: 79 MMHG

## 2019-10-08 VITALS
HEIGHT: 65 IN | RESPIRATION RATE: 16 BRPM | HEART RATE: 67 BPM | SYSTOLIC BLOOD PRESSURE: 182 MMHG | WEIGHT: 165 LBS | DIASTOLIC BLOOD PRESSURE: 79 MMHG | BODY MASS INDEX: 27.49 KG/M2

## 2019-10-08 DIAGNOSIS — R35.0 FREQUENCY OF MICTURITION: ICD-10-CM

## 2019-10-08 PROCEDURE — 54235 NJX CORPORA CAVERNOSA RX AGT: CPT

## 2019-10-08 PROCEDURE — 99214 OFFICE O/P EST MOD 30 MIN: CPT | Mod: 25

## 2019-10-08 PROCEDURE — 93980 PENILE VASCULAR STUDY: CPT | Mod: 26

## 2019-10-08 PROCEDURE — 93980 PENILE VASCULAR STUDY: CPT

## 2019-10-08 NOTE — PHYSICAL EXAM
[General Appearance - Well Developed] : well developed [General Appearance - Well Nourished] : well nourished [Normal Appearance] : normal appearance [General Appearance - In No Acute Distress] : no acute distress [Well Groomed] : well groomed [Abdomen Soft] : soft [Urethral Meatus] : meatus normal [Urinary Bladder Findings] : the bladder was normal on palpation [Testes Mass (___cm)] : there were no testicular masses [Scrotum] : the scrotum was normal [Skin Turgor] : supple [Oriented To Time, Place, And Person] : oriented to person, place, and time [Affect] : the affect was normal [Mood] : the mood was normal [Not Anxious] : not anxious [Normal Station and Gait] : the gait and station were normal for the patient's age

## 2019-10-08 NOTE — HISTORY OF PRESENT ILLNESS
[FreeTextEntry1] : The patient returns for followup to review his recent blood studies and to discuss options for therapy.\par \par PMH: Patient initially presented with a chief complaint of erectile dysfunction. Patient had a radical prostatectomy a year and a half prior. He is unable to obtain an erection. His erections are not modified with the degree of sexual stimulation.   He states that his erections presently are often less than 0 out of 10 in both tumescence and rigidity, insufficient for penetration.   He often ejaculates through a flaccid phallus.  He has difficulty maintaining an erection. He describes a normal libido.  His sexual dysfunction occurs with both sexual relations and masturbation.  His erections are not improved with PDE5 inhibitors.   His partner is understanding and was unable to be with him at the visit today. He is .  \par \par His past medical history is as stated above.  In his present occupation he has no known toxin exposure. He does not smoke and drinks socially .  He has no known drug allergies. His review of systems and past medical history demonstrates no significant urologic issues (see patient completed questionnaire). His family history demonstrates no significant urologic issues.

## 2019-10-08 NOTE — ASSESSMENT
[FreeTextEntry1] : The patient returns for followup to review his recent blood studies and to discuss options for therapy. Blood studies demonstrated elevated triglycerides. I recommend a medical evaluation. Let's ultrasound study demonstrated neurogenic dysfunction . He required aspiration and phenylephrine. See him back for injection training at 0.05 cc of the Trimix.

## 2019-10-11 DIAGNOSIS — N52.9 MALE ERECTILE DYSFUNCTION, UNSPECIFIED: ICD-10-CM

## 2019-10-22 ENCOUNTER — APPOINTMENT (OUTPATIENT)
Dept: UROLOGY | Facility: CLINIC | Age: 54
End: 2019-10-22
Payer: COMMERCIAL

## 2019-10-22 VITALS
BODY MASS INDEX: 27.49 KG/M2 | RESPIRATION RATE: 16 BRPM | WEIGHT: 165 LBS | HEART RATE: 63 BPM | SYSTOLIC BLOOD PRESSURE: 158 MMHG | DIASTOLIC BLOOD PRESSURE: 79 MMHG | HEIGHT: 65 IN

## 2019-10-22 DIAGNOSIS — N52.9 MALE ERECTILE DYSFUNCTION, UNSPECIFIED: ICD-10-CM

## 2019-10-22 PROCEDURE — 99214 OFFICE O/P EST MOD 30 MIN: CPT

## 2019-10-22 NOTE — HISTORY OF PRESENT ILLNESS
[FreeTextEntry1] : The patient returns for followup for injection training.\par \par PMH: Patient initially presented with a chief complaint of erectile dysfunction. Patient had a radical prostatectomy a year and a half prior. He is unable to obtain an erection. His erections are not modified with the degree of sexual stimulation.   He states that his erections presently are often less than 0 out of 10 in both tumescence and rigidity, insufficient for penetration.   He often ejaculates through a flaccid phallus.  He has difficulty maintaining an erection. He describes a normal libido.  His sexual dysfunction occurs with both sexual relations and masturbation.  His erections are not improved with PDE5 inhibitors.   His partner is understanding and was unable to be with him at the visit today. He is .  \par \par His past medical history is as stated above.  In his present occupation he has no known toxin exposure. He does not smoke and drinks socially .  He has no known drug allergies. His review of systems and past medical history demonstrates no significant urologic issues (see patient completed questionnaire). His family history demonstrates no significant urologic issues.

## 2019-10-22 NOTE — ASSESSMENT
[FreeTextEntry1] : Patient returned for his first penile injection training.  He stated that he has not been able to obtain an erection sufficient for penetration with PDE5 inhibitors.  He was not interested in using a vacuum constriction device at this time. He wanted to try penile injection therapy.\par \par We reviewed the procedure with the relative risks and benefits. A copy of the informed consent is on file. I assisted him in injecting 0.05 cc of the TriMix into the right corpora. At 30 minutes he had 95 % tumescence and 90 % rigidity. His erection dissipated prior to his leaving the office.\par \par We discussed the use of a pharmacologic agent in the diagnostic evaluation of organic and in special instances psychogenic erectile dysfunction.  He was made fully aware that several medications used may not be approved by the [employer] for this purpose, although they may be well recognized and accepted by the [employer] as a treatment and diagnostic tool for impotence.  He understands that there still remains a need to standardize the indications, contraindications and dosage parameters for the testing as well as treatment purposes of this procedure. He understands that the TriMix is a compounded medication and that there are other, FDA approved, injectable medications available for  use. \par \par He received a brochure on injection therapy and I have taken particular care in reviewing carefully all potential complications of this procedure and made him  fully aware that even a death has been reported in conjunction with this therapy.  Never-the-less, weighing all risks and benefits that this procedure affords, he was willing to proceed with the use of a intracavernosal injection of the pharmacological agent prescribed and either compounded by a pharmaceutical company, pharmacist or by Dr. Mills as part of the diagnostic evaluation and/or use of this pharmacologic agent as a treatment for  erectile dysfunction. He was made aware that a prolonged erection (priapism) might result from penile injections and that it must be treated within four hours to prevent damage to the erectile mechanism of his penis. He acknowledged that he must notify Dr. Mills (or the covering doctor) and have this condition treated within fours hours should it occur. He had the opportunity to ask any questions all of which were answered to his satisfaction. \par \par He will call with any concerns or questions. I will see him back in follow up prior to dispensing additional medication. He will be injecting 0.02 to 0.3 cc of the Trimix. I will see him back in 3 months in followup.\par

## 2019-10-22 NOTE — PHYSICAL EXAM
[General Appearance - Well Developed] : well developed [Normal Appearance] : normal appearance [General Appearance - Well Nourished] : well nourished [Well Groomed] : well groomed [General Appearance - In No Acute Distress] : no acute distress [Abdomen Soft] : soft [Urethral Meatus] : meatus normal [Scrotum] : the scrotum was normal [Urinary Bladder Findings] : the bladder was normal on palpation [Testes Mass (___cm)] : there were no testicular masses [Skin Turgor] : supple [Oriented To Time, Place, And Person] : oriented to person, place, and time [Mood] : the mood was normal [Affect] : the affect was normal [Not Anxious] : not anxious [Normal Station and Gait] : the gait and station were normal for the patient's age

## 2019-11-21 ENCOUNTER — APPOINTMENT (OUTPATIENT)
Dept: UROLOGY | Facility: CLINIC | Age: 54
End: 2019-11-21

## 2020-02-26 ENCOUNTER — APPOINTMENT (OUTPATIENT)
Dept: UROLOGY | Facility: CLINIC | Age: 55
End: 2020-02-26

## 2020-06-22 ENCOUNTER — APPOINTMENT (OUTPATIENT)
Dept: UROLOGY | Facility: CLINIC | Age: 55
End: 2020-06-22
Payer: MEDICAID

## 2020-06-22 VITALS — SYSTOLIC BLOOD PRESSURE: 142 MMHG | DIASTOLIC BLOOD PRESSURE: 63 MMHG | HEART RATE: 73 BPM

## 2020-06-22 VITALS — TEMPERATURE: 98.7 F

## 2020-06-22 DIAGNOSIS — N39.3 STRESS INCONTINENCE (FEMALE) (MALE): ICD-10-CM

## 2020-06-22 PROCEDURE — 99213 OFFICE O/P EST LOW 20 MIN: CPT

## 2020-06-22 NOTE — ASSESSMENT
[FreeTextEntry1] : pT2 prognostic grade 3 prostate ca s/p RALRP in 1/2019. DOing well\par --Kegel exercises for recovery of urinary function\par --PSA today\par --F/u with Dr Mills for ED\par --RTC in 6mo

## 2020-06-22 NOTE — REVIEW OF SYSTEMS
[Fever] : fever [Chills] : chills [Abdominal Pain] : abdominal pain [Wake up at night to urinate  How many times?  ___] : wakes up to urinate [unfilled] times during the night [Pain during urination] : pain during urination [Strong urge to urinate] : strong urge to urinate [Strain or push to urinate] : strain or push to urinate [Wait a long time to urinate] : waits a long time to urinate [Slow urine stream] : slow urine stream [Interrupted urine stream] : interrupted urine stream [Negative] : Heme/Lymph

## 2020-06-22 NOTE — PHYSICAL EXAM
[General Appearance - Well Developed] : well developed [General Appearance - Well Nourished] : well nourished [Skin Color & Pigmentation] : normal skin color and pigmentation [General Appearance - In No Acute Distress] : no acute distress [Edema] : no peripheral edema [Exaggerated Use Of Accessory Muscles For Inspiration] : no accessory muscle use [Oriented To Time, Place, And Person] : oriented to person, place, and time [No Focal Deficits] : no focal deficits [Normal Station and Gait] : the gait and station were normal for the patient's age [Abdomen Tenderness] : non-tender [Costovertebral Angle Tenderness] : no ~M costovertebral angle tenderness [Abdomen Soft] : soft

## 2020-06-22 NOTE — HISTORY OF PRESENT ILLNESS
[FreeTextEntry1] : 54yo gentleman with cc of prostate ca. Pt initially with hx of dysuria, increased urinary frequency, increased urgency, new hesitancy. No feelings of incomplete emptying. No fevers or chills. Went to see PCP. Had UCx that showed EColi. At that time, also had CBC that showed leukocytosis to 14 and PSA was elevated to 20. His UCx was positive and he was treated with cipro and sx returned to baseline. PSA improved to 6.52 but was still elevated and REBEKAH showed nodule at apex. He underwent bx that returned with prognostic grade 1 and 2 prostate ca in 5 of 12 cores involving 20-80%. Ordered MRI for eval which showed 2.1cm PiRAD 5 lesion with no gross JATIN but broad tumor-capsular contact and capsular bulging. \par \par Discussed management and pt elected robotic radical prostatectomy with bilateral pelvic lymph node dissection. Non-nerve sparing on left. Uncomplicated procedure. Pathology which showed pT2 (R0) N0 prognostic grade 3 (upgraded) prostate cancer. Post surgery PSA was undetectable. \par \par Pt returns today for follow-up. He reports no bother from urinary sx. He is having occasional stress incontinence with sneeze or heavy lifting. He reports having significant ED. He was referred to Dr Mills and was given trimix with benefit.

## 2020-06-23 LAB — PSA SERPL-MCNC: <0.01 NG/ML

## 2020-12-09 ENCOUNTER — APPOINTMENT (OUTPATIENT)
Dept: UROLOGY | Facility: CLINIC | Age: 55
End: 2020-12-09

## 2020-12-16 PROBLEM — N39.0 ACUTE UTI: Status: RESOLVED | Noted: 2018-09-13 | Resolved: 2020-12-16

## 2021-04-20 NOTE — H&P PST ADULT - VENOUS THROMBOEMBOLISM CURRENT STATUS
Patient requesting refill: Buspar   Last OV: 2/16/21  Next OV: No upcoming appointment specified   Last refill: 1/27/21  Disp: 60 tablet Refill: 3     Can be refilled per protocol.     (2) malignancy (present or previous)

## 2023-01-25 ENCOUNTER — APPOINTMENT (OUTPATIENT)
Dept: UROLOGY | Facility: CLINIC | Age: 58
End: 2023-01-25

## 2023-02-27 ENCOUNTER — APPOINTMENT (OUTPATIENT)
Dept: UROLOGY | Facility: CLINIC | Age: 58
End: 2023-02-27
Payer: MEDICAID

## 2023-02-27 VITALS
RESPIRATION RATE: 16 BRPM | HEART RATE: 64 BPM | OXYGEN SATURATION: 98 % | DIASTOLIC BLOOD PRESSURE: 83 MMHG | SYSTOLIC BLOOD PRESSURE: 166 MMHG | TEMPERATURE: 97.5 F

## 2023-02-27 DIAGNOSIS — C61 MALIGNANT NEOPLASM OF PROSTATE: ICD-10-CM

## 2023-02-27 DIAGNOSIS — N52.9 MALE ERECTILE DYSFUNCTION, UNSPECIFIED: ICD-10-CM

## 2023-02-27 PROCEDURE — 99213 OFFICE O/P EST LOW 20 MIN: CPT

## 2023-02-27 RX ORDER — PAPAVERINE HYDROCHLORIDE 30 MG/ML
30 INJECTION, SOLUTION INTRAVENOUS
Qty: 2 | Refills: 0 | Status: DISCONTINUED | COMMUNITY
Start: 2019-09-17 | End: 2023-02-27

## 2023-02-27 RX ORDER — DICLOFENAC SODIUM 50 MG/1
50 TABLET, DELAYED RELEASE ORAL
Qty: 30 | Refills: 0 | Status: DISCONTINUED | COMMUNITY
Start: 2016-11-29 | End: 2023-02-27

## 2023-02-27 RX ORDER — MELOXICAM 15 MG/1
15 TABLET ORAL
Qty: 60 | Refills: 2 | Status: DISCONTINUED | COMMUNITY
Start: 2017-01-18 | End: 2023-02-27

## 2023-02-27 RX ORDER — D-METHORPHAN/PE/ACETAMINOPHEN 5-325MG/15
100-10 LIQUID (ML) ORAL
Qty: 118 | Refills: 0 | Status: DISCONTINUED | COMMUNITY
Start: 2016-11-07 | End: 2023-02-27

## 2023-02-27 RX ORDER — SILDENAFIL 20 MG/1
20 TABLET ORAL
Qty: 30 | Refills: 11 | Status: DISCONTINUED | COMMUNITY
Start: 2019-05-17 | End: 2023-02-27

## 2023-02-27 RX ORDER — METHYLPREDNISOLONE 4 MG/1
4 TABLET ORAL
Qty: 12 | Refills: 0 | Status: DISCONTINUED | COMMUNITY
Start: 2016-11-29 | End: 2023-02-27

## 2023-02-27 NOTE — REVIEW OF SYSTEMS
[Fever] : fever [Chills] : chills [Abdominal Pain] : abdominal pain [Pain during urination] : pain during urination [Wake up at night to urinate  How many times?  ___] : wakes up to urinate [unfilled] times during the night [Strong urge to urinate] : strong urge to urinate [Strain or push to urinate] : strain or push to urinate [Wait a long time to urinate] : waits a long time to urinate [Slow urine stream] : slow urine stream [Interrupted urine stream] : interrupted urine stream [Negative] : Heme/Lymph

## 2023-03-01 LAB — PSA SERPL-MCNC: 0.06 NG/ML

## 2023-03-02 PROBLEM — C61 PROSTATE CANCER: Status: ACTIVE | Noted: 2018-10-29

## 2023-03-02 PROBLEM — N52.9 ORGANIC IMPOTENCE: Status: ACTIVE | Noted: 2019-05-21

## 2023-03-02 NOTE — ASSESSMENT
[FreeTextEntry1] : pT2 prognostic grade 3 prostate ca s/p RALRP in 1/2019. 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 Currently not interested in tx

## 2023-03-02 NOTE — HISTORY OF PRESENT ILLNESS
[FreeTextEntry1] : 58yo gentleman with cc of prostate ca and ED. Pt referred to  in 2018 after developing acute change in urinary sx 2/2 UTI and found to have PSA was elevated to 20. PSA improved to 6.52  post tx but was still elevated and REBEKAH showed nodule at apex. He underwent bx that returned with prognostic grade 1 and 2 prostate ca in 5 of 12 cores involving 20-80%. Ordered MRI for eval which showed 2.1cm PiRAD 5 lesion with no gross JATIN but broad tumor-capsular contact and capsular bulging. Pt elected robotic radical prostatectomy with bilateral pelvic lymph node dissection. Non-nerve sparing on left. Uncomplicated procedure. Pathology which showed pT2 (R0) N0 prognostic grade 3 (upgraded) prostate cancer. Post surgery PSA was undetectable. Pt last seen 2020, PSA remained undetectable. He did report significant ED. He was referred to Dr Mills and was given trimix with benefit. \par \par He comes back in today for f/u. He reports still having issues with erections but not interested in treatment. \par Rare CAROLYN. \par \par No new issues. He is on a statin for cholesterol as only med.

## 2023-07-06 NOTE — DISCHARGE NOTE ADULT - MODE OF TRANSPORTATION
Ambulatory Dapsone Counseling: I discussed with the patient the risks of dapsone including but not limited to hemolytic anemia, agranulocytosis, rashes, methemoglobinemia, kidney failure, peripheral neuropathy, headaches, GI upset, and liver toxicity.  Patients who start dapsone require monitoring including baseline LFTs and weekly CBCs for the first month, then every month thereafter.  The patient verbalized understanding of the proper use and possible adverse effects of dapsone.  All of the patient's questions and concerns were addressed.

## 2024-09-12 NOTE — PRE-OP CHECKLIST - AICD PRESENT
AMBULATORY CASE MANAGEMENT NOTE    Names and Relationships of Patient/Support Persons: Contact: Nabeel Bedoya; Relationship: Self -     Patient Outreach    Patient returned call and ECM explained. He states he has finished both IV and oral antibiotics. States no s/s on infection. He does have a pmh of htn but is taking his prescribed medications and monitors BP both at work or ED and states it is good right now. No other concerns and declines for now.    CASEY JAIN  Ambulatory Case Management    9/12/2024, 16:00 EDT   no

## 2024-12-04 NOTE — DISCHARGE NOTE ADULT - PLAN OF CARE
RN called and spoke with patient, scheduled for Office visit 12/4/24 w/ extender.     Jin OH RN 12/4/2024 at 9:20 AM     Pain control Empty enamorado bag as needed as instructed.  Drink plenty of fluids.  No heavy lifting or straining for 4 to 6 weeks, avoid constipation. You may have intermittent pink tinged urine and small amounts of leakage around enamorado (due to bladder spasms).  This is normal.   If your urine becomes bright red or with clots, or there is no urine in the bag, please call the office. You may shower, just pat white strips dry, they will fall off in a few weeks. Change dressing at drain site daily or as needed until dry.  Call Dr. Santana's officevto schedule a follow up appointment next week for enamorado removal and further management.  Call the office if you have fever greater than 101, no urine in bag, pain not relieved with pain medication, nausea/vomiting.